# Patient Record
Sex: FEMALE | Race: WHITE | Employment: FULL TIME | URBAN - METROPOLITAN AREA
[De-identification: names, ages, dates, MRNs, and addresses within clinical notes are randomized per-mention and may not be internally consistent; named-entity substitution may affect disease eponyms.]

---

## 2018-02-22 ENCOUNTER — OFFICE VISIT (OUTPATIENT)
Dept: INTERNAL MEDICINE CLINIC | Age: 23
End: 2018-02-22

## 2018-02-22 VITALS
WEIGHT: 105 LBS | HEIGHT: 65 IN | HEART RATE: 56 BPM | OXYGEN SATURATION: 99 % | TEMPERATURE: 98 F | SYSTOLIC BLOOD PRESSURE: 129 MMHG | RESPIRATION RATE: 17 BRPM | DIASTOLIC BLOOD PRESSURE: 79 MMHG | BODY MASS INDEX: 17.49 KG/M2

## 2018-02-22 DIAGNOSIS — Z00.00 WELL ADULT ON ROUTINE HEALTH CHECK: Primary | ICD-10-CM

## 2018-02-22 DIAGNOSIS — R63.0 ANOREXIA: ICD-10-CM

## 2018-02-22 LAB
HCG URINE, QL. (POC): NEGATIVE
VALID INTERNAL CONTROL?: YES

## 2018-02-22 NOTE — PATIENT INSTRUCTIONS
Anorexia: Care Instructions  Your Care Instructions    Anorexia is a type of eating disorder. People who have anorexia don't eat enough to stay at a normal weight. Sometimes they also exercise too much. They do these things because they're so afraid of gaining weight. They believe that they're fat, even when they're thin. Often they think that losing even more weight will solve their problems and make their lives better. If you have anorexia, it can really harm your health. This is because your body doesn't get the nutrition it needs. The first step to controlling the problem is admitting that something is wrong. Then counseling can help you change how you think about food, the way you see your body, and any other emotional issues. It may take months or years, but you can recover from anorexia. Follow-up care is a key part of your treatment and safety. Be sure to make and go to all appointments, and call your doctor if you are having problems. It's also a good idea to know your test results and keep a list of the medicines you take. How can you care for yourself at home? Follow your treatment plan  · Go to your counseling sessions. Call or talk with your counselor if you can't go or if you think the sessions aren't helping. Don't just stop going. · Take your medicines exactly as prescribed. Call your doctor if you think you are having a problem with your medicine. You will get more details on the specific medicines your doctor prescribes. Trust people who are helping you  · Listen to what counselors and nutrition experts say about healthy eating. · Learn about what is included in a balanced diet. Then discuss what you learn. · Let people know how you are feeling. Listen to how they are feeling too. · Accept support and feedback from other people. Learn to be easier on yourself  · Learn to focus on your good qualities. · If your body feels weak, slow down and do less.   · Remind yourself that feeling bad about yourself is all part of your disorder. · Remember that it takes time to recover from anorexia. · Spend time with other people doing things you enjoy. · Try to focus on one goal at a time. · Don't blame yourself for your disorder. Develop healthy eating habits  · With your doctor and counselor, you will decide on a good weight for you. You will also decide how much weight you will gain each week. · Try not to argue with the people you eat with. Arguing increases stress. And stress can make make it harder for you to relax and eat. · Talk with other people during meals about things that interest you. Don't talk about food or gaining weight. Caring for a loved one with anorexia  · Remind yourself that anorexia is a long-lasting disorder. It takes time for changes to occur. · Show love and support for your loved one, especially if he or she gets discouraged. · Support your loved one as he or she goes through the steps of recovery. Focus on wellness. Don't focus on food. · Take care of yourself. Continue with your own interests, career, hobbies, and friends. · Don't blame yourself or look for the reason for the disorder. · If you are having a hard time, talk with a counselor. · Learn what to do if your loved one relapses. When should you call for help? Call 911 anytime you think you may need emergency care. For example, call if:  · A person with anorexia seems depressed and is talking about suicide. If the talk about suicide seems real, stay with the person, or ask someone you trust to stay with the person, until you get emergency help. · You have a rapid or irregular heartbeat. · You passed out (lost consciousness). Call your doctor now or seek immediate medical care if:  · You feel hopeless or have thoughts of hurting yourself. Watch closely for changes in your health, and be sure to contact your doctor if:  · You have trouble sleeping. · You feel anxious or depressed.   Where can you learn more?  Go to http://annalise-laila.info/. Enter W351 in the search box to learn more about \"Anorexia: Care Instructions. \"  Current as of: May 12, 2017  Content Version: 11.4  © 5164-2330 Healthwise, [employer]. Care instructions adapted under license by [employer] (which disclaims liability or warranty for this information). If you have questions about a medical condition or this instruction, always ask your healthcare professional. [address] any warranty or liability for your use of this information.

## 2018-02-22 NOTE — MR AVS SNAPSHOT
[address] [place] [address]. Ray Hector [address] [address] 
[phone] Patient: Seth Fatima MRN: [id number] :1995 Visit Information Date & Time Provider Department Dept. Phone Encounter #  
 2018  2:30 PM Елена Gilliam MD [facility] Internal Medicine [phone] [id number] Follow-up Instructions Return in about 1 month (around 3/22/2018). Upcoming Health Maintenance Date Due DTaP/Tdap/Td series (6 - Td) 2016 PAP AKA CERVICAL CYTOLOGY 2016 Influenza Age 5 to Adult 2017 Allergies as of 2018  Review Complete On: 2018 By: Елена Gilliam MD  
  
 Severity Noted Reaction Type Reactions Nitrous Oxide High 2013    Anaphylaxis HAD TO BE BAGGED IN DENTIST OFFICE PER MOTHER Other Medication High 2013    Anaphylaxis Nitrous Oxide Septocaine [Articaine-epinephrine] High 2013    Anaphylaxis HAD TO BE BAGGED IN DENTIST OFFICE PER MOTHER Current Immunizations  Reviewed on 2013 Name Date DTAP Vaccine 1996, 1996, 1996, 1995, 1995 HIB Vaccine 10/4/1996, 1996, 1995, 1995 HPV 2010, 2009 Hepatitis B Vaccine 3/25/1996, 1995, 1995 Human Papillomavirus 2009 IPV 2000, 1996, 1995, 1995 MMR Vaccine 2000, 10/4/1996 Meningococcal Vaccine 2008 PPD 1/10/2013  4:01 PM  
 TDAP Vaccine 2006 Varicella Virus Vaccine 1/10/2013  4:01 PM  
 Varicella Virus Vaccine Live 1996 Not reviewed this visit You Were Diagnosed With   
  
 Codes Comments Well adult on routine health check    -  Primary ICD-10-CM: Z00.00 ICD-9-CM: V70.0 Anorexia     ICD-10-CM: R63.0 ICD-9-CM: 076. 0 Vitals BP Pulse Temp Resp Height(growth percentile) Weight(growth percentile)  129/79 (BP 1 Location: Left arm, BP Patient Position: Sitting) (!) 54 08 °F (36.7 °C) (Temporal) 17 5' 4.5\" (1.638 m) 105 lb (47.6 kg) LMP SpO2 BMI OB Status Smoking Status 12/22/2017 99% 17.74 kg/m2 Unknown Never Smoker Vitals History BMI and BSA Data Body Mass Index Body Surface Area 17.74 kg/m 2 1.47 m 2 Preferred Pharmacy Pharmacy Name Phone [id number]. [phone] Your Updated Medication List  
  
   
This list is accurate as of 2/22/18  2:58 PM.  Always use your most recent med list.  
  
  
  
  
 escitalopram oxalate 10 mg tablet Commonly known as:  Zakia Shanti TAKE 1 TABLET BY MOUTH DAILY. HYDROcodone-acetaminophen 5-325 mg per tablet Commonly known as:  Flor Megan Take 1 Tab by mouth every six (6) hours as needed for Pain. ibuprofen 600 mg tablet Commonly known as:  MOTRIN Take 1 Tab by mouth every six (6) hours as needed for Pain. norethindrone-ethinyl estradiol 1-20 mg-mcg Tab Commonly known as:  JUNEL 1/20 (21) Take 1 Tab by mouth daily. * ZyrTEC 10 mg Cap Generic drug:  Cetirizine Take 1 Cap by mouth daily. * ZYRTEC PO Take  by mouth. * Notice: This list has 2 medication(s) that are the same as other medications prescribed for you. Read the directions carefully, and ask your doctor or other care provider to review them with you. We Performed the Following 10-PANEL URINE DRUG SCREEN [[id number] Custom] AMB POC EKG ROUTINE W/ 12 LEADS, INTER & REP [95342 CPT(R)] AMB POC URINE PREGNANCY TEST, VISUAL COLOR COMPARISON [98713 CPT(R)] AMYLASE [id number] CPT(R)] CBC WITH AUTOMATED DIFF [44971 CPT(R)] HEP B SURFACE AB [id number] CPT(R)] LIPASE [id number] CPT(R)] MAGNESIUM [id number] CPT(R)] METABOLIC PANEL, COMPREHENSIVE [94389 CPT(R)] PHOSPHORUS [66821 CPT(R)] QUANTIFERON TB GOLD [[id number] Custom] TSH AND FREE T4 [75415 CPT(R)] UA/M W/RFLX CULTURE, COMP [91717 CPT(R)] Follow-up Instructions Return in about 1 month (around 3/22/2018). Patient Instructions Anorexia: Care Instructions Your Care Instructions Anorexia is a type of eating disorder. People who have anorexia don't eat enough to stay at a normal weight. Sometimes they also exercise too much. They do these things because they're so afraid of gaining weight. They believe that they're fat, even when they're thin. Often they think that losing even more weight will solve their problems and make their lives better. If you have anorexia, it can really harm your health. This is because your body doesn't get the nutrition it needs. The first step to controlling the problem is admitting that something is wrong. Then counseling can help you change how you think about food, the way you see your body, and any other emotional issues. It may take months or years, but you can recover from anorexia. Follow-up care is a key part of your treatment and safety. Be sure to make and go to all appointments, and call your doctor if you are having problems. It's also a good idea to know your test results and keep a list of the medicines you take. How can you care for yourself at home? Follow your treatment plan · Go to your counseling sessions. Call or talk with your counselor if you can't go or if you think the sessions aren't helping. Don't just stop going. · Take your medicines exactly as prescribed. Call your doctor if you think you are having a problem with your medicine. You will get more details on the specific medicines your doctor prescribes. Trust people who are helping you · Listen to what counselors and nutrition experts say about healthy eating. · Learn about what is included in a balanced diet. Then discuss what you learn. · Let people know how you are feeling. Listen to how they are feeling too. · Accept support and feedback from other people. Learn to be easier on yourself · Learn to focus on your good qualities. · If your body feels weak, slow down and do less. · Remind yourself that feeling bad about yourself is all part of your disorder. · Remember that it takes time to recover from anorexia. · Spend time with other people doing things you enjoy. · Try to focus on one goal at a time. · Don't blame yourself for your disorder. Develop healthy eating habits · With your doctor and counselor, you will decide on a good weight for you. You will also decide how much weight you will gain each week. · Try not to argue with the people you eat with. Arguing increases stress. And stress can make make it harder for you to relax and eat. · Talk with other people during meals about things that interest you. Don't talk about food or gaining weight. Caring for a loved one with anorexia · Remind yourself that anorexia is a long-lasting disorder. It takes time for changes to occur. · Show love and support for your loved one, especially if he or she gets discouraged. · Support your loved one as he or she goes through the steps of recovery. Focus on wellness. Don't focus on food. · Take care of yourself. Continue with your own interests, career, hobbies, and friends. · Don't blame yourself or look for the reason for the disorder. · If you are having a hard time, talk with a counselor. · Learn what to do if your loved one relapses. When should you call for help? Call 911 anytime you think you may need emergency care. For example, call if: · A person with anorexia seems depressed and is talking about suicide. If the talk about suicide seems real, stay with the person, or ask someone you trust to stay with the person, until you get emergency help. · You have a rapid or irregular heartbeat. · You passed out (lost consciousness). Call your doctor now or seek immediate medical care if: 
· You feel hopeless or have thoughts of hurting yourself. Watch closely for changes in your health, and be sure to contact your doctor if: 
· You have trouble sleeping. · You feel anxious or depressed. Where can you learn more? Go to http://annalise-laila.info/. Enter F117 in the search box to learn more about \"Anorexia: Care Instructions. \" Current as of: May 12, 2017 Content Version: 11.4 © 5417-0529 [employer]. Care instructions adapted under license by [employer] (which disclaims liability or warranty for this information). If you have questions about a medical condition or this instruction, always ask your healthcare professional. [address] any warranty or liability for your use of this information. Introducing [facility]! Juan Payne introduces Floodlight patient portal. Now you can access parts of your medical record, email your doctor's office, and request medication refills online. 1. In your internet browser, go to [email or web address] 2. Click on the First Time User? Click Here link in the Sign In box. You will see the New Member Sign Up page. 3. Enter your Floodlight Access Code exactly as it appears below. You will not need to use this code after youve completed the sign-up process. If you do not sign up before the expiration date, you must request a new code. · Floodlight Access Code: [email or web address] Expires: 5/23/2018  2:57 PM 
 
4. Enter the last four digits of your Social Security Number (xxxx) and Date of Birth (mm/dd/yyyy) as indicated and click Submit. You will be taken to the next sign-up page. 5. Create a Floodlight ID. This will be your Floodlight login ID and cannot be changed, so think of one that is secure and easy to remember. 6. Create a Floodlight password. You can change your password at any time. 7. Enter your Password Reset Question and Answer. This can be used at a later time if you forget your password. 8. Enter your e-mail address. You will receive e-mail notification when new information is available in [address] 9. Click Sign Up. You can now view and download portions of your medical record. 10. Click the Download Summary menu link to download a portable copy of your medical information. If you have questions, please visit the Frequently Asked Questions section of the [employer] website. Remember, Scanbuy is NOT to be used for urgent needs. For medical emergencies, dial 911. Now available from your iPhone and Android! Please provide this summary of care documentation to your next provider. Your primary care clinician is listed as Jose Mcgovern. If you have any questions after today's visit, please call [phone].

## 2018-02-22 NOTE — LETTER
2/28/2018 4:33 PM 
 
Ms. [employer] [address] [facility] Dear [employer]: Please find your most recent results below. Resulted Orders CBC WITH AUTOMATED DIFF Result Value Ref Range WBC 5.4 3.4 - 10.8 x10E3/uL  
 RBC 4.34 3.77 - 5.28 x10E6/uL HGB 13.0 11.1 - 15.9 g/dL HCT 39.1 34.0 - 46.6 % MCV 90 79 - 97 fL  
 MCH 30.0 26.6 - 33.0 pg  
 MCHC 33.2 31.5 - 35.7 g/dL  
 RDW 14.0 12.3 - 15.4 % PLATELET 022 931 - 281 x10E3/uL NEUTROPHILS 63 Not Estab. % Lymphocytes 25 Not Estab. % MONOCYTES 7 Not Estab. % EOSINOPHILS 3 Not Estab. % BASOPHILS 2 Not Estab. %  
 ABS. NEUTROPHILS 3.4 1.4 - 7.0 x10E3/uL Abs Lymphocytes 1.3 0.7 - 3.1 x10E3/uL  
 ABS. MONOCYTES 0.4 0.1 - 0.9 x10E3/uL  
 ABS. EOSINOPHILS 0.2 0.0 - 0.4 x10E3/uL  
 ABS. BASOPHILS 0.1 0.0 - 0.2 x10E3/uL IMMATURE GRANULOCYTES 0 Not Estab. %  
 ABS. IMM. GRANS. 0.0 0.0 - 0.1 x10E3/uL Narrative Performed at:  [address]  [id number] : Bry Kinney MD, Phone:  [phone] METABOLIC PANEL, COMPREHENSIVE Result Value Ref Range Glucose 74 65 - 99 mg/dL BUN 9 6 - 20 mg/dL Creatinine 0.73 0.57 - 1.00 mg/dL GFR est non- >59 mL/min/1.73 GFR est  >59 mL/min/1.73  
 BUN/Creatinine ratio 12 9 - 23 Sodium 141 134 - 144 mmol/L Potassium 4.4 3.5 - 5.2 mmol/L Chloride 98 96 - 106 mmol/L  
 CO2 24 18 - 29 mmol/L Calcium 9.8 8.7 - 10.2 mg/dL Protein, total 7.6 6.0 - 8.5 g/dL Albumin 5.1 3.5 - 5.5 g/dL GLOBULIN, TOTAL 2.5 1.5 - 4.5 g/dL A-G Ratio 2.0 1.2 - 2.2 Bilirubin, total 1.0 0.0 - 1.2 mg/dL Alk. phosphatase 51 39 - 117 IU/L  
 AST (SGOT) 22 0 - 40 IU/L  
 ALT (SGPT) 10 0 - 32 IU/L Narrative Performed at:  [address]  [id number] : Bry Kinney MD, Phone:  [phone] LIPASE Result Value Ref Range Lipase 28 14 - 72 U/L Narrative Performed at:  [address]  [id number] : Emre Meneses MD, Phone:  [phone] AMYLASE Result Value Ref Range Amylase 43 31 - 124 U/L Narrative Performed at:  [address]  [id number] : Emre Meneses MD, Phone:  [phone] PHOSPHORUS Result Value Ref Range Phosphorus 3.2 2.5 - 4.5 mg/dL Narrative Performed at:  [address]  [id number] : Emre Meneses MD, Phone:  [phone] MAGNESIUM Result Value Ref Range Magnesium 2.1 1.6 - 2.3 mg/dL Narrative Performed at:  [address]  [id number] : Emre Meneses MD, Phone:  [phone] UA/M W/RFLX CULTURE, COMP Result Value Ref Range Specific Gravity 1.006 1.005 - 1.030  
 pH (UA) 6.0 5.0 - 7.5 Color Yellow Yellow Appearance Clear Clear Leukocyte Esterase Trace (A) Negative Protein Negative Negative/Trace Glucose Negative Negative Ketone Negative Negative Blood Negative Negative Bilirubin Negative Negative Urobilinogen 0.2 0.2 - 1.0 mg/dL Nitrites Negative Negative Microscopic Examination See additional order Comment:  
   Microscopic was indicated and was performed. URINALYSIS REFLEX Comment Comment: This specimen has reflexed to a Urine Culture. Narrative Performed at:  [address]  [id number] : Emre Meneses MD, Phone:  [phone] AMB POC URINE PREGNANCY TEST, VISUAL COLOR COMPARISON Result Value Ref Range VALID INTERNAL CONTROL POC Yes HCG urine, Ql. (POC) Negative Negative HEP B SURFACE AB Result Value Ref Range HEP B SURFACE AB, QUAL Non Reactive Comment: Non Reactive: Inconsistent with immunity, 
                            less than 10 mIU/mL Reactive:     Consistent with immunity, 
                            greater than 9.9 mIU/mL Narrative Performed at:  [address]  [id number] : Afia Justin MD, Phone:  [phone]  
10-PANEL URINE DRUG SCREEN Result Value Ref Range Amphetamines, urine Negative Ixkpnt=0174 ng/mL Comment: Amphetamine test includes Amphetamine and Methamphetamine. MDMA Negative Tjknxh=837 ng/mL Barbiturates Negative Dkiduf=804 ng/mL Benzodiazepines Negative Wcinpa=294 ng/mL Cannabinoids Positive (A) Cutoff=50 Comment:  
   Carboxy THC GC/MS Conf      144                ng/mL Cutoff=10        01 Cocaine Negative Quetou=856 ng/mL Methaqualone Negative Nftmml=570 ng/mL Opiates Negative Bbrfqg=458 ng/mL Comment:  
   Opiate test includes Codeine and Morphine only. Phencyclidine Negative Cutoff=25 ng/mL Methadone Screen, Urine Negative Uxgqpo=532 ng/mL PROPOXYPHENE, URINE Negative Aayzgf=789 ng/mL Narrative Performed at:  [address] [place] [address]  [id number] : Antionette Shoemaker MD, Phone:  [phone] TSH AND FREE T4 Result Value Ref Range TSH 1.610 0.450 - 4.500 uIU/mL T4, Free 1.06 0.82 - 1.77 ng/dL Narrative Performed at:  [address]  [id number] : Afia Justin MD, Phone:  [phone] MICROSCOPIC EXAMINATION Result Value Ref Range WBC 0-5 0 - 5 /hpf  
 RBC 0-2 0 - 2 /hpf Epithelial cells 0-10 0 - 10 /hpf Casts None seen None seen /lpf Bacteria Few None seen/Few Narrative Performed at:  [address]  [id number] : Afia Justin MD, Phone:  [phone] URINE CULTURE, COMPREHENSIVE   
 Result Value Ref Range Urine Culture,Comprehensive Mixed urogenital dada 50,000-100,000 colony forming units per mL Narrative Performed at:  [address]  [id number] : Rachel Fontenot MD, Phone:  [phone] QUANTIFERON IN TUBE REFL Result Value Ref Range QuantiFERON TB Gold (A) Negative Indeterminate Mitogen (positive control) gave low response. Comment: This may indicate anergy or immune suppression. Early draws and 
extended transit time may also result in low positive control and 
indeterminate results. QUANTIFERON CRITERIA Comment Comment: To be considered positive a specimen should have a TB Ag minus Nil 
value greater than or equal to 0.35 IU/mL and in addition the TB Ag 
minus Nil value must be greater than or equal to 25% of the Nil 
value. There may be insufficient information in these values to 
differentiate between some negative and some indeterminate test 
values. QuantiFERON TB Ag Value 0.01 IU/mL QuantiFERON Nil Value 0.05 IU/mL QuantiFERON Mitogen Value 0.02 IU/mL  
 QFT TB Ag minus Nil Value <0.00 IU/mL Interpretation: Comment Comment:  
   The QuantiFERON TB Gold (in Tube) assay is intended for use as an aid 
in the diagnosis of TB infection. Negative results suggest that there 
is no TB infection. In patients with high suspicion of exposure, a 
negative test should be repeated. A positive test indicates infection 
with Mycobacterium tuberculosis. Among individuals without 
tuberculosis infection, a positive test may be due to exposure to 
[place], M. szulgai or M. marinum. On the Internet, go to 
cdc.gov/tb for further details. Narrative Performed at:  [address]  [id number] : Rachel Fontenot MD, Phone:  [phone] RECOMMENDATIONS: 
 
 Please call me if you have any questions: [phone] Sincerely, 
 
 
Paradise Faith MD

## 2018-02-23 NOTE — PROGRESS NOTES
Subjective:     Chief Complaint   Patient presents with    Complete Physical     for admission to specialty hospital for eating disorders        She  is a 25y.o. year old female who presents today as a new patient to establish and have a physical for her up coming inpatient hospital admission for anorexia nervosa. She is here with her friend. She reports that she has been started having Anorexia nervosa for the past one year but recently its been worse. She states that she literally stopped eating anything. If she eat something her anxiety and stress level goes up. She was on lexapro until 2016. She was never seen a psychiatrist.   No period for the past two months. Has been using laxatives for BM. Denies any N/V. Feels achy. Does not take any vitamin supplement. Lives with boyfriend. A comprehensive review of systems was negative except for that written in the HPI. Objective:     Vitals:    02/22/18 1425   BP: 129/79   Pulse: (!) 56   Resp: 17   Temp: 98 °F (36.7 °C)   TempSrc: Temporal   SpO2: 99%   Weight: 105 lb (47.6 kg)   Height: 5' 4.5\" (1.638 m)       Physical Examination: General appearance - alert, well appearing, and in no distress, oriented to person, place, and time and melissa appearence. Mental status - alert, oriented to person, place, and time, depressed mood, behavior, speech, dress, motor activity, and thought processes.    Ears - bilateral TM's and external ear canals normal  Nose - normal and patent, no erythema, discharge or polyps  Mouth - mucous membranes moist, pharynx normal without lesions  Neck - supple, no significant adenopathy, thyroid exam: thyroid is normal in size without nodules or tenderness  Lymphatics - no palpable lymphadenopathy, no hepatosplenomegaly  Chest - clear to auscultation, no wheezes, rales or rhonchi, symmetric air entry  Heart - normal rate, regular rhythm, normal S1, S2, no murmurs, rubs, clicks or gallops  Abdomen - soft, nontender, nondistended, no masses or organomegaly  Neurological - alert, oriented, normal speech, no focal findings or movement disorder noted  Musculoskeletal - no joint tenderness, deformity or swelling  Extremities - peripheral pulses normal, no pedal edema, no clubbing or cyanosis  Skin - dry skin. Allergies   Allergen Reactions    Nitrous Oxide Anaphylaxis     HAD TO BE BAGGED IN DENTIST OFFICE PER MOTHER    Other Medication Anaphylaxis     Nitrous Oxide      Septocaine [Articaine-Epinephrine] Anaphylaxis     HAD TO BE BAGGED IN DENTIST OFFICE PER MOTHER      Social History     Social History    Marital status: SINGLE     Spouse name: N/A    Number of children: N/A    Years of education: N/A     Social History Main Topics    Smoking status: Never Smoker    Smokeless tobacco: Never Used    Alcohol use No    Drug use: Yes     Special: Marijuana    Sexual activity: Yes     Partners: Male     Other Topics Concern    None     Social History Narrative    ** Merged History Encounter **           Family History   Problem Relation Age of Onset    No Known Problems Father     Stroke Maternal Grandmother     Cancer Maternal Grandmother      Lung    Heart Disease Maternal Grandfather     Hypertension Maternal Grandfather     Cancer Maternal Grandfather      Brain    Hypertension Paternal Grandmother     Other Paternal Grandmother      ALLERGY TO NOVACAINE    Heart Disease Paternal Grandfather     Other Paternal Grandfather      DIALYSIS      Past Surgical History:   Procedure Laterality Date    HX HEENT      ORAL SURGERY-LOCAL WITH NITROUS OXIDE    HX OTHER SURGICAL      Wart removed from L arm.       Past Medical History:   Diagnosis Date    Anxiety 3/27/2014    Depression 3/27/2014    Depression, major, single episode, mild ([facility] [address] 3/27/2014    Dysmenorrhea 2/26/2016    Eczema     History of seasonal allergies     Insomnia 3/27/2014      Current Outpatient Prescriptions   Medication Sig Dispense Refill    norethindrone-ethinyl estradiol (JUNEL 1/20, 21,) 1-20 mg-mcg tab Take 1 Tab by mouth daily. 90 Tab 0    escitalopram oxalate (LEXAPRO) 10 mg tablet TAKE 1 TABLET BY MOUTH DAILY. 90 Tab 1    CETIRIZINE HCL (ZYRTEC PO) Take  by mouth.  Cetirizine (ZYRTEC) 10 mg Cap Take 1 Cap by mouth daily.  HYDROcodone-acetaminophen (NORCO) 5-325 mg per tablet Take 1 Tab by mouth every six (6) hours as needed for Pain. 15 Tab 0    ibuprofen (MOTRIN) 600 mg tablet Take 1 Tab by mouth every six (6) hours as needed for Pain. 20 Tab 0        Assessment/ Plan:   Diagnoses and all orders for this visit:    1. Well adult on routine health check  -     CBC WITH AUTOMATED DIFF  -     METABOLIC PANEL, COMPREHENSIVE  -     TSH AND FREE T4    2. Anorexia  -     CBC WITH AUTOMATED DIFF  -     METABOLIC PANEL, COMPREHENSIVE  -     LIPASE  -     AMYLASE  -     PHOSPHORUS  -     MAGNESIUM  -     UA/M W/RFLX CULTURE, COMP  -     AMB POC URINE PREGNANCY TEST, VISUAL COLOR COMPARISON  -     HEP B SURFACE AB  -     10-PANEL URINE DRUG SCREEN  -     TSH AND FREE T4  -     AMB POC EKG ROUTINE W/ 12 LEADS, INTER & REP  -     QUANTIFERON TB GOLD       Above test was performed today as a requirement for her up coming hospital admission for eating disorder. Counseling provided. Medication risks/benefits/costs/interactions/alternatives discussed with patient. Advised patient to call back or return to office if symptoms worsen/change/persist. If patient cannot reach us or should anything more severe/urgent arise he/she should proceed directly to the nearest emergency department. Discussed expected course/resolution/complications of diagnosis in detail with patient. Patient given a written after visit summary which includes her diagnoses, current medications and vitals. Patient expressed understanding with the diagnosis and plan.        Follow-up Disposition:  Return in about 1 month (around 3/22/2018), or if symptoms worsen or fail to improve.

## 2018-02-26 ENCOUNTER — TELEPHONE (OUTPATIENT)
Dept: INTERNAL MEDICINE CLINIC | Age: 23
End: 2018-02-26

## 2018-02-26 NOTE — TELEPHONE ENCOUNTER
Ramona Vincent and wanted to know if visit note, ekg results, lab results, and forms that were faxed over were completed.     FAX: [phone]

## 2018-02-27 LAB
ALBUMIN SERPL-MCNC: 5.1 G/DL (ref 3.5–5.5)
ALBUMIN/GLOB SERPL: 2 {RATIO} (ref 1.2–2.2)
ALP SERPL-CCNC: 51 IU/L (ref 39–117)
ALT SERPL-CCNC: 10 IU/L (ref 0–32)
AMPHETAMINES UR QL SCN: NEGATIVE NG/ML
AMYLASE SERPL-CCNC: 43 U/L (ref 31–124)
ANNOTATION COMMENT IMP: ABNORMAL
APPEARANCE UR: CLEAR
AST SERPL-CCNC: 22 IU/L (ref 0–40)
BACTERIA #/AREA URNS HPF: NORMAL /[HPF]
BACTERIA UR CULT: NORMAL
BARBITURATES UR QL SCN: NEGATIVE NG/ML
BASOPHILS # BLD AUTO: 0.1 X10E3/UL (ref 0–0.2)
BASOPHILS NFR BLD AUTO: 2 %
BENZODIAZ UR QL: NEGATIVE NG/ML
BILIRUB SERPL-MCNC: 1 MG/DL (ref 0–1.2)
BILIRUB UR QL STRIP: NEGATIVE
BUN SERPL-MCNC: 9 MG/DL (ref 6–20)
BUN/CREAT SERPL: 12 (ref 9–23)
BZE UR QL: NEGATIVE NG/ML
CALCIUM SERPL-MCNC: 9.8 MG/DL (ref 8.7–10.2)
CANNABINOIDS UR QL CFM: POSITIVE
CASTS URNS QL MICRO: NORMAL /LPF
CHLORIDE SERPL-SCNC: 98 MMOL/L (ref 96–106)
CO2 SERPL-SCNC: 24 MMOL/L (ref 18–29)
COLOR UR: YELLOW
CREAT SERPL-MCNC: 0.73 MG/DL (ref 0.57–1)
EOSINOPHIL # BLD AUTO: 0.2 X10E3/UL (ref 0–0.4)
EOSINOPHIL NFR BLD AUTO: 3 %
EPI CELLS #/AREA URNS HPF: NORMAL /HPF
ERYTHROCYTE [DISTWIDTH] IN BLOOD BY AUTOMATED COUNT: 14 % (ref 12.3–15.4)
GAMMA INTERFERON BACKGROUND BLD IA-ACNC: 0.05 IU/ML
GFR SERPLBLD CREATININE-BSD FMLA CKD-EPI: 117 ML/MIN/1.73
GFR SERPLBLD CREATININE-BSD FMLA CKD-EPI: 135 ML/MIN/1.73
GLOBULIN SER CALC-MCNC: 2.5 G/DL (ref 1.5–4.5)
GLUCOSE SERPL-MCNC: 74 MG/DL (ref 65–99)
GLUCOSE UR QL: NEGATIVE
HBV SURFACE AB SER QL: NON REACTIVE
HCT VFR BLD AUTO: 39.1 % (ref 34–46.6)
HGB BLD-MCNC: 13 G/DL (ref 11.1–15.9)
HGB UR QL STRIP: NEGATIVE
IMM GRANULOCYTES # BLD: 0 X10E3/UL (ref 0–0.1)
IMM GRANULOCYTES NFR BLD: 0 %
KETONES UR QL STRIP: NEGATIVE
LEUKOCYTE ESTERASE UR QL STRIP: ABNORMAL
LIPASE SERPL-CCNC: 28 U/L (ref 14–72)
LYMPHOCYTES # BLD AUTO: 1.3 X10E3/UL (ref 0.7–3.1)
LYMPHOCYTES NFR BLD AUTO: 25 %
M TB IFN-G BLD-IMP: ABNORMAL
M TB IFN-G CD4+ BCKGRND COR BLD-ACNC: <0 IU/ML
M TB IFN-G CD4+ T-CELLS BLD-ACNC: 0.01 IU/ML
MAGNESIUM SERPL-MCNC: 2.1 MG/DL (ref 1.6–2.3)
MCH RBC QN AUTO: 30 PG (ref 26.6–33)
MCHC RBC AUTO-ENTMCNC: 33.2 G/DL (ref 31.5–35.7)
MCV RBC AUTO: 90 FL (ref 79–97)
MDMA UR QL SCN: NEGATIVE NG/ML
METHADONE UR QL SCN: NEGATIVE NG/ML
METHAQUALONE UR QL: NEGATIVE NG/ML
MICRO URNS: ABNORMAL
MITOGEN IGNF BLD-ACNC: 0.02 IU/ML
MONOCYTES # BLD AUTO: 0.4 X10E3/UL (ref 0.1–0.9)
MONOCYTES NFR BLD AUTO: 7 %
NEUTROPHILS # BLD AUTO: 3.4 X10E3/UL (ref 1.4–7)
NEUTROPHILS NFR BLD AUTO: 63 %
NITRITE UR QL STRIP: NEGATIVE
OPIATES UR QL: NEGATIVE NG/ML
PCP UR QL: NEGATIVE NG/ML
PH UR STRIP: 6 [PH] (ref 5–7.5)
PHOSPHATE SERPL-MCNC: 3.2 MG/DL (ref 2.5–4.5)
PLATELET # BLD AUTO: 309 X10E3/UL (ref 150–379)
POTASSIUM SERPL-SCNC: 4.4 MMOL/L (ref 3.5–5.2)
PROPOXYPH UR QL: NEGATIVE NG/ML
PROT SERPL-MCNC: 7.6 G/DL (ref 6–8.5)
PROT UR QL STRIP: NEGATIVE
QUANTIFERON INCUBATION: NORMAL
RBC # BLD AUTO: 4.34 X10E6/UL (ref 3.77–5.28)
RBC #/AREA URNS HPF: NORMAL /HPF
SERVICE CMNT-IMP: ABNORMAL
SODIUM SERPL-SCNC: 141 MMOL/L (ref 134–144)
SP GR UR: 1.01 (ref 1–1.03)
T4 FREE SERPL-MCNC: 1.06 NG/DL (ref 0.82–1.77)
TSH SERPL DL<=0.005 MIU/L-ACNC: 1.61 UIU/ML (ref 0.45–4.5)
URINALYSIS REFLEX , 377201: ABNORMAL
UROBILINOGEN UR STRIP-MCNC: 0.2 MG/DL (ref 0.2–1)
WBC # BLD AUTO: 5.4 X10E3/UL (ref 3.4–10.8)
WBC #/AREA URNS HPF: NORMAL /HPF

## 2018-02-28 ENCOUNTER — TELEPHONE (OUTPATIENT)
Dept: INTERNAL MEDICINE CLINIC | Age: 23
End: 2018-02-28

## 2018-02-28 NOTE — PROGRESS NOTES
Please fax the result to the inpatient hospital for eating disorder. TB test in indeterminate. Need a follow up TB test in one month.

## 2018-02-28 NOTE — TELEPHONE ENCOUNTER
----- Message from Елена Gilliam MD sent at 2/28/2018  4:16 PM EST -----  Please fax the result to the inpatient hospital for eating disorder. TB test in indeterminate. Need a follow up TB test in one month.

## 2018-02-28 NOTE — TELEPHONE ENCOUNTER
Discussed results with pt's mother. She verbalized her understanding. Request that copy of results be mailed.  Results already faxed to inpatient hospital.

## 2018-03-06 ENCOUNTER — TELEPHONE (OUTPATIENT)
Dept: INTERNAL MEDICINE CLINIC | Age: 23
End: 2018-03-06

## 2018-03-06 NOTE — TELEPHONE ENCOUNTER
Pt would like to speak to Dr. Ángela Mendoza about some treatment options her specialist had decided on.

## 2018-03-08 ENCOUNTER — TELEPHONE (OUTPATIENT)
Dept: INTERNAL MEDICINE CLINIC | Age: 23
End: 2018-03-08

## 2018-03-08 NOTE — TELEPHONE ENCOUNTER
Returned call:    Spoke with Lorrie acosta: dietician    Reports that patient had decided not go inpatient service. She wanted to have outpatient Rx for her eating disorder. Lorrie Pepe wanted to let me know that patient has started seeing a psychotherapist as well. Plan is to see her next week for electrolyte check and if things does not work out then she needs to go for inpatient care. Plan is that psychotherapist will call her to make an appointment with me for next week.

## 2018-03-08 NOTE — TELEPHONE ENCOUNTER
Would like to speak to Dr. Rosa Maria Smith, stated she sees pt about eating disorder, will be treating her for increasing food intake to bring up weight but recommended that she follows up with dr. patel frequently, most likely weekly while food intake is happening to make sure she is medically stable.

## 2018-03-14 ENCOUNTER — OFFICE VISIT (OUTPATIENT)
Dept: INTERNAL MEDICINE CLINIC | Age: 23
End: 2018-03-14

## 2018-03-14 VITALS
HEART RATE: 59 BPM | DIASTOLIC BLOOD PRESSURE: 80 MMHG | HEIGHT: 65 IN | OXYGEN SATURATION: 100 % | SYSTOLIC BLOOD PRESSURE: 130 MMHG | TEMPERATURE: 97.1 F | RESPIRATION RATE: 18 BRPM | BODY MASS INDEX: 17.73 KG/M2 | WEIGHT: 106.4 LBS

## 2018-03-14 DIAGNOSIS — R63.0 ANOREXIA: Primary | ICD-10-CM

## 2018-03-14 DIAGNOSIS — G47.01 INSOMNIA DUE TO MEDICAL CONDITION: ICD-10-CM

## 2018-03-14 DIAGNOSIS — F41.9 ANXIETY: ICD-10-CM

## 2018-03-14 RX ORDER — TRAZODONE HYDROCHLORIDE 50 MG/1
50 TABLET ORAL
Qty: 30 TAB | Refills: 2 | Status: SHIPPED | OUTPATIENT
Start: 2018-03-14 | End: 2018-06-07 | Stop reason: SDUPTHER

## 2018-03-14 NOTE — MR AVS SNAPSHOT
[address] [place] [address] 
 
 
 [address] 
[phone] Patient: Latosha Contreras MRN: [id number] :1995 Visit Information Date & Time Provider Department Dept. Phone Encounter #  
 3/14/2018  3:00 PM Samy Sal MD [facility] Internal Medicine [phone] [id number] Follow-up Instructions Return in about 1 week (around 3/21/2018). Your Appointments 3/22/2018  1:45 PM  
ROUTINE CARE with Samy Sal MD  
[facility] Internal Medicine [facility] Appt Note: follow up  
 [address] [facility] [id number]  
[phone]  
  
   
 [address] [address] Upcoming Health Maintenance Date Due DTaP/Tdap/Td series (6 - Td) 2016 PAP AKA CERVICAL CYTOLOGY 2016 Influenza Age 5 to Adult 2017 Allergies as of 3/14/2018  Review Complete On: 3/14/2018 By: Samy Sal MD  
  
 Severity Noted Reaction Type Reactions Nitrous Oxide High 2013    Anaphylaxis HAD TO BE BAGGED IN DENTIST OFFICE PER MOTHER Other Medication High 2013    Anaphylaxis Nitrous Oxide Septocaine [Articaine-epinephrine] High 2013    Anaphylaxis HAD TO BE BAGGED IN DENTIST OFFICE PER MOTHER Current Immunizations  Reviewed on 2013 Name Date DTAP Vaccine 1996, 1996, 1996, 1995, 1995 HIB Vaccine 10/4/1996, 1996, 1995, 1995 HPV 2010, 2009 Hepatitis B Vaccine 3/25/1996, 1995, 1995 Human Papillomavirus 2009 IPV 2000, 1996, 1995, 1995 MMR Vaccine 2000, 10/4/1996 Meningococcal Vaccine 2008 PPD 1/10/2013  4:01 PM  
 TDAP Vaccine 2006 Varicella Virus Vaccine 1/10/2013  4:01 PM  
 Varicella Virus Vaccine Live 1996 Not reviewed this visit You Were Diagnosed With   
  
 Codes Comments Anorexia    -  Primary ICD-10-CM: R63.0 ICD-9-CM: 783.0 Anxiety     ICD-10-CM: F41.9 ICD-9-CM: 300.00 Insomnia due to medical condition     ICD-10-CM: G47.01 
ICD-9-CM: 327.01 Vitals BP Pulse Temp Resp Height(growth percentile) Weight(growth percentile) 153/89 (BP 1 Location: Left arm, BP Patient Position: Sitting) (!) 59 97.1 °F (36.2 °C) (Temporal) 18 5' 4.5\" (1.638 m) 106 lb 6.4 oz (48.3 kg) LMP SpO2 BMI OB Status Smoking Status 02/21/2018 100% 17.98 kg/m2 Unknown Never Smoker Vitals History BMI and BSA Data Body Mass Index Body Surface Area  
 17.98 kg/m 2 1.48 m 2 Preferred Pharmacy [facility] Name Phone [id number] [address] [facility]. [phone] Your Updated Medication List  
  
   
This list is accurate as of 3/14/18  3:27 PM.  Always use your most recent med list.  
  
  
  
  
 traZODone 50 mg tablet Commonly known as:  Lolis Gold Take 1 Tab by mouth nightly. Prescriptions Sent to Pharmacy Refills  
 traZODone (DESYREL) 50 mg tablet 2 Sig: Take 1 Tab by mouth nightly. Class: Normal  
 Pharmacy: [facility] [place] [address] [address] [facility].  #: [phone] Route: Oral  
  
We Performed the Following AMYLASE L657494 CPT(R)] CBC WITH AUTOMATED DIFF [84681 CPT(R)] LIPASE [id number] CPT(R)] MAGNESIUM [id number] CPT(R)] METABOLIC PANEL, COMPREHENSIVE [05568 CPT(R)] PHOSPHORUS [71440 CPT(R)] Follow-up Instructions Return in about 1 week (around 3/21/2018). Patient Instructions Anorexia: Care Instructions Your Care Instructions Anorexia is a type of eating disorder. People who have anorexia don't eat enough to stay at a normal weight. Sometimes they also exercise too much. They do these things because they're so afraid of gaining weight. They believe that they're fat, even when they're thin.  Often they think that losing even more weight will solve their problems and make their lives better. If you have anorexia, it can really harm your health. This is because your body doesn't get the nutrition it needs. The first step to controlling the problem is admitting that something is wrong. Then counseling can help you change how you think about food, the way you see your body, and any other emotional issues. It may take months or years, but you can recover from anorexia. Follow-up care is a key part of your treatment and safety. Be sure to make and go to all appointments, and call your doctor if you are having problems. It's also a good idea to know your test results and keep a list of the medicines you take. How can you care for yourself at home? Follow your treatment plan · Go to your counseling sessions. Call or talk with your counselor if you can't go or if you think the sessions aren't helping. Don't just stop going. · Take your medicines exactly as prescribed. Call your doctor if you think you are having a problem with your medicine. You will get more details on the specific medicines your doctor prescribes. Trust people who are helping you · Listen to what counselors and nutrition experts say about healthy eating. · Learn about what is included in a balanced diet. Then discuss what you learn. · Let people know how you are feeling. Listen to how they are feeling too. · Accept support and feedback from other people. Learn to be easier on yourself · Learn to focus on your good qualities. · If your body feels weak, slow down and do less. · Remind yourself that feeling bad about yourself is all part of your disorder. · Remember that it takes time to recover from anorexia. · Spend time with other people doing things you enjoy. · Try to focus on one goal at a time. · Don't blame yourself for your disorder. Develop healthy eating habits · With your doctor and counselor, you will decide on a good weight for you. You will also decide how much weight you will gain each week. · Try not to argue with the people you eat with. Arguing increases stress. And stress can make make it harder for you to relax and eat. · Talk with other people during meals about things that interest you. Don't talk about food or gaining weight. Caring for a loved one with anorexia · Remind yourself that anorexia is a long-lasting disorder. It takes time for changes to occur. · Show love and support for your loved one, especially if he or she gets discouraged. · Support your loved one as he or she goes through the steps of recovery. Focus on wellness. Don't focus on food. · Take care of yourself. Continue with your own interests, career, hobbies, and friends. · Don't blame yourself or look for the reason for the disorder. · If you are having a hard time, talk with a counselor. · Learn what to do if your loved one relapses. When should you call for help? Call 911 anytime you think you may need emergency care. For example, call if: · A person with anorexia seems depressed and is talking about suicide. If the talk about suicide seems real, stay with the person, or ask someone you trust to stay with the person, until you get emergency help. · You have a rapid or irregular heartbeat. · You passed out (lost consciousness). Call your doctor now or seek immediate medical care if: 
· You feel hopeless or have thoughts of hurting yourself. Watch closely for changes in your health, and be sure to contact your doctor if: 
· You have trouble sleeping. · You feel anxious or depressed. Where can you learn more? Go to http://annalise-laila.info/. Enter O662 in the search box to learn more about \"Anorexia: Care Instructions. \" Current as of: May 12, 2017 Content Version: 11.4 © 0810-4626 Healthwise, AddressReport.  Care instructions adapted under license by [employer] (which disclaims liability or warranty for this information). If you have questions about a medical condition or this instruction, always ask your healthcare professional. [address] any warranty or liability for your use of this information. Introducing [facility] SERVICES! [employer] introduces Z[employer]m [employer]R patient portal. Now you can access parts of your medical record, email your doctor's office, and request medication refills online. 1. In your internet browser, go to [email or web address] 2. Click on the First Time User? Click Here link in the Sign In box. You will see the New Member Sign Up page. 3. Enter your ZÃ¼m XR Access Code exactly as it appears below. You will not need to use this code after youve completed the sign-up process. If you do not sign up before the expiration date, you must request a new code. · ZÃ¼m XR Access Code: [email or web address] Expires: 5/23/2018  3:57 PM 
 
4. Enter the last four digits of your Social Security Number (xxxx) and Date of Birth (mm/dd/yyyy) as indicated and click Submit. You will be taken to the next sign-up page. 5. Create a ZÃ¼m XR ID. This will be your Z[employer]¼m XR login ID and cannot be changed, so think of one that is secure and easy to remember. 6. Create a ZÃ¼m XR password. You can change your password at any time. 7. Enter your Password Reset Question and Answer. This can be used at a later time if you forget your password. 8. Enter your e-mail address. You will receive e-mail notification when new information is available in [address] 9. Click Sign Up. You can now view and download portions of your medical record. 10. Click the Download Summary menu link to download a portable copy of your medical information. If you have questions, please visit the Frequently Asked Questions section of the ZÃ¼m XR website. Remember, Z[employer]R is NOT to be used for urgent needs. For medical emergencies, dial 911. Now available from your iPhone and Android! Please provide this summary of care documentation to your next provider. Your primary care clinician is listed as Mal Felder. If you have any questions after today's visit, please call [phone].

## 2018-03-14 NOTE — PATIENT INSTRUCTIONS
Anorexia: Care Instructions  Your Care Instructions    Anorexia is a type of eating disorder. People who have anorexia don't eat enough to stay at a normal weight. Sometimes they also exercise too much. They do these things because they're so afraid of gaining weight. They believe that they're fat, even when they're thin. Often they think that losing even more weight will solve their problems and make their lives better. If you have anorexia, it can really harm your health. This is because your body doesn't get the nutrition it needs. The first step to controlling the problem is admitting that something is wrong. Then counseling can help you change how you think about food, the way you see your body, and any other emotional issues. It may take months or years, but you can recover from anorexia. Follow-up care is a key part of your treatment and safety. Be sure to make and go to all appointments, and call your doctor if you are having problems. It's also a good idea to know your test results and keep a list of the medicines you take. How can you care for yourself at home? Follow your treatment plan  · Go to your counseling sessions. Call or talk with your counselor if you can't go or if you think the sessions aren't helping. Don't just stop going. · Take your medicines exactly as prescribed. Call your doctor if you think you are having a problem with your medicine. You will get more details on the specific medicines your doctor prescribes. Trust people who are helping you  · Listen to what counselors and nutrition experts say about healthy eating. · Learn about what is included in a balanced diet. Then discuss what you learn. · Let people know how you are feeling. Listen to how they are feeling too. · Accept support and feedback from other people. Learn to be easier on yourself  · Learn to focus on your good qualities. · If your body feels weak, slow down and do less.   · Remind yourself that feeling bad about yourself is all part of your disorder. · Remember that it takes time to recover from anorexia. · Spend time with other people doing things you enjoy. · Try to focus on one goal at a time. · Don't blame yourself for your disorder. Develop healthy eating habits  · With your doctor and counselor, you will decide on a good weight for you. You will also decide how much weight you will gain each week. · Try not to argue with the people you eat with. Arguing increases stress. And stress can make make it harder for you to relax and eat. · Talk with other people during meals about things that interest you. Don't talk about food or gaining weight. Caring for a loved one with anorexia  · Remind yourself that anorexia is a long-lasting disorder. It takes time for changes to occur. · Show love and support for your loved one, especially if he or she gets discouraged. · Support your loved one as he or she goes through the steps of recovery. Focus on wellness. Don't focus on food. · Take care of yourself. Continue with your own interests, career, hobbies, and friends. · Don't blame yourself or look for the reason for the disorder. · If you are having a hard time, talk with a counselor. · Learn what to do if your loved one relapses. When should you call for help? Call 911 anytime you think you may need emergency care. For example, call if:  · A person with anorexia seems depressed and is talking about suicide. If the talk about suicide seems real, stay with the person, or ask someone you trust to stay with the person, until you get emergency help. · You have a rapid or irregular heartbeat. · You passed out (lost consciousness). Call your doctor now or seek immediate medical care if:  · You feel hopeless or have thoughts of hurting yourself. Watch closely for changes in your health, and be sure to contact your doctor if:  · You have trouble sleeping. · You feel anxious or depressed.   Where can you learn more?  Go to http://annalise-laila.info/. Enter W005 in the search box to learn more about \"Anorexia: Care Instructions. \"  Current as of: May 12, 2017  Content Version: 11.4  © 0875-1486 Healthwise, Scope 5. Care instructions adapted under license by [employer] (which disclaims liability or warranty for this information). If you have questions about a medical condition or this instruction, always ask your healthcare professional. [address] any warranty or liability for your use of this information.

## 2018-03-15 ENCOUNTER — TELEPHONE (OUTPATIENT)
Dept: INTERNAL MEDICINE CLINIC | Age: 23
End: 2018-03-15

## 2018-03-15 LAB
ALBUMIN SERPL-MCNC: 5.1 G/DL (ref 3.5–5.5)
ALBUMIN/GLOB SERPL: 2 {RATIO} (ref 1.2–2.2)
ALP SERPL-CCNC: 54 IU/L (ref 39–117)
ALT SERPL-CCNC: 11 IU/L (ref 0–32)
AMYLASE SERPL-CCNC: 57 U/L (ref 31–124)
AST SERPL-CCNC: 17 IU/L (ref 0–40)
BASOPHILS # BLD AUTO: 0.1 X10E3/UL (ref 0–0.2)
BASOPHILS NFR BLD AUTO: 1 %
BILIRUB SERPL-MCNC: 1 MG/DL (ref 0–1.2)
BUN SERPL-MCNC: 11 MG/DL (ref 6–20)
BUN/CREAT SERPL: 15 (ref 9–23)
CALCIUM SERPL-MCNC: 10.3 MG/DL (ref 8.7–10.2)
CHLORIDE SERPL-SCNC: 95 MMOL/L (ref 96–106)
CO2 SERPL-SCNC: 28 MMOL/L (ref 18–29)
CREAT SERPL-MCNC: 0.73 MG/DL (ref 0.57–1)
EOSINOPHIL # BLD AUTO: 0.1 X10E3/UL (ref 0–0.4)
EOSINOPHIL NFR BLD AUTO: 2 %
ERYTHROCYTE [DISTWIDTH] IN BLOOD BY AUTOMATED COUNT: 13.8 % (ref 12.3–15.4)
GFR SERPLBLD CREATININE-BSD FMLA CKD-EPI: 117 ML/MIN/1.73
GFR SERPLBLD CREATININE-BSD FMLA CKD-EPI: 135 ML/MIN/1.73
GLOBULIN SER CALC-MCNC: 2.6 G/DL (ref 1.5–4.5)
GLUCOSE SERPL-MCNC: 75 MG/DL (ref 65–99)
HCT VFR BLD AUTO: 42.3 % (ref 34–46.6)
HGB BLD-MCNC: 14.3 G/DL (ref 11.1–15.9)
IMM GRANULOCYTES # BLD: 0 X10E3/UL (ref 0–0.1)
IMM GRANULOCYTES NFR BLD: 0 %
LIPASE SERPL-CCNC: 26 U/L (ref 14–72)
LYMPHOCYTES # BLD AUTO: 2 X10E3/UL (ref 0.7–3.1)
LYMPHOCYTES NFR BLD AUTO: 30 %
MAGNESIUM SERPL-MCNC: 1.9 MG/DL (ref 1.6–2.3)
MCH RBC QN AUTO: 31 PG (ref 26.6–33)
MCHC RBC AUTO-ENTMCNC: 33.8 G/DL (ref 31.5–35.7)
MCV RBC AUTO: 92 FL (ref 79–97)
MONOCYTES # BLD AUTO: 0.3 X10E3/UL (ref 0.1–0.9)
MONOCYTES NFR BLD AUTO: 5 %
NEUTROPHILS # BLD AUTO: 4.3 X10E3/UL (ref 1.4–7)
NEUTROPHILS NFR BLD AUTO: 62 %
PHOSPHATE SERPL-MCNC: 3.9 MG/DL (ref 2.5–4.5)
PLATELET # BLD AUTO: 323 X10E3/UL (ref 150–379)
POTASSIUM SERPL-SCNC: 5 MMOL/L (ref 3.5–5.2)
PROT SERPL-MCNC: 7.7 G/DL (ref 6–8.5)
RBC # BLD AUTO: 4.62 X10E6/UL (ref 3.77–5.28)
SODIUM SERPL-SCNC: 139 MMOL/L (ref 134–144)
WBC # BLD AUTO: 6.9 X10E3/UL (ref 3.4–10.8)

## 2018-03-15 NOTE — PROGRESS NOTES
Subjective:     Chief Complaint   Patient presents with    Eating Disorder     1 mo f/u        She  is a 25y.o. year old female with anorexia nervosa who presents today for follow up on her eating disorder. She was suppose to start a inpatient rehab therapy two weeks ago but due to insurance issue she was not able to so she started following up with psychotherapist as well as a nutritionist. Patient reports that she has started eating little better, has stopped taking laxtives and exercise. Gained one pound since last visit. Denies any palpitation, abdominal pain, chest pain, N/V. States that she feels more energetic. Has been trying to take 9 00-1,000 calorie diet/24 hrs. Patient does have h/o anxiety and insomnia issue. Since she started eating little bit her anxiety has been worse. No SI or homicidal ideation. Lives with boyfriend and her parents are involve with her care as well. A comprehensive review of systems was negative except for that written in the HPI. Objective:     Vitals:    03/14/18 1501 03/14/18 2014   BP: 153/89 130/80   Pulse: (!) 59    Resp: 18    Temp: 97.1 °F (36.2 °C)    TempSrc: Temporal    SpO2: 100%    Weight: 106 lb 6.4 oz (48.3 kg)    Height: 5' 4.5\" (1.638 m)        Physical Examination: General appearance - alert, well appearing, and in no distress, oriented to person, place, and time and looks lot better than lat time.    Mental status - alert, oriented to person, place, and time, normal mood, behavior, speech, dress, motor activity, and thought processes  Mouth - mucous membranes moist, pharynx normal without lesions  Neck - supple, no significant adenopathy  Chest - clear to auscultation, no wheezes, rales or rhonchi, symmetric air entry  Heart - normal rate, regular rhythm, normal S1, S2, no murmurs, rubs, clicks or gallops  Neurological - alert, oriented, normal speech, no focal findings or movement disorder noted  Skin - normal coloration and turgor, no rashes, no suspicious skin lesions noted    Allergies   Allergen Reactions    Nitrous Oxide Anaphylaxis     HAD TO BE BAGGED IN DENTIST OFFICE PER MOTHER    Other Medication Anaphylaxis     Nitrous Oxide      Septocaine [Articaine-Epinephrine] Anaphylaxis     HAD TO BE BAGGED IN DENTIST OFFICE PER MOTHER      Social History     Social History    Marital status: SINGLE     Spouse name: N/A    Number of children: N/A    Years of education: N/A     Social History Main Topics    Smoking status: Never Smoker    Smokeless tobacco: Never Used    Alcohol use No    Drug use: Yes     Special: Marijuana    Sexual activity: Yes     Partners: Male     Other Topics Concern    None     Social History Narrative    ** Merged History Encounter **           Family History   Problem Relation Age of Onset    No Known Problems Father     Stroke Maternal Grandmother     Cancer Maternal Grandmother      Lung    Heart Disease Maternal Grandfather     Hypertension Maternal Grandfather     Cancer Maternal Grandfather      Brain    Hypertension Paternal Grandmother     Other Paternal Grandmother      ALLERGY TO NOVACAINE    Heart Disease Paternal Grandfather     Other Paternal Grandfather      DIALYSIS      Past Surgical History:   Procedure Laterality Date    HX HEENT      ORAL SURGERY-LOCAL WITH NITROUS OXIDE    HX OTHER SURGICAL      Wart removed from L arm. Past Medical History:   Diagnosis Date    Anxiety 3/27/2014    Depression 3/27/2014    Depression, major, single episode, mild ([facility] [address] 3/27/2014    Dysmenorrhea 2/26/2016    Eczema     History of seasonal allergies     Insomnia 3/27/2014      Current Outpatient Prescriptions   Medication Sig Dispense Refill    traZODone (DESYREL) 50 mg tablet Take 1 Tab by mouth nightly. 30 Tab 2        Assessment/ Plan:   Diagnoses and all orders for this visit:    1. Anorexia  -     traZODone (DESYREL) 50 mg tablet; Take 1 Tab by mouth nightly.   -     CBC WITH AUTOMATED DIFF  - AMYLASE  -     LIPASE  -     METABOLIC PANEL, COMPREHENSIVE  -     MAGNESIUM  -     PHOSPHORUS    2. Anxiety  -     traZODone (DESYREL) 50 mg tablet; Take 1 Tab by mouth nightly. -     CBC WITH AUTOMATED DIFF  -     AMYLASE  -     LIPASE  -     METABOLIC PANEL, COMPREHENSIVE  -     MAGNESIUM  -     PHOSPHORUS    3. Insomnia due to medical condition  -     traZODone (DESYREL) 50 mg tablet; Take 1 Tab by mouth nightly. -     CBC WITH AUTOMATED DIFF  -     AMYLASE  -     LIPASE  -     METABOLIC PANEL, COMPREHENSIVE  -     MAGNESIUM  -     PHOSPHORUS      Recent blood work is normal.   After discussing different Rx option for an anxiety, anorexia, insomnia we have come up with a decision that Trazodone will be a better option. States that she was on trazodone in the past and the med worked well. Advised to start 25 mg and slowly increased to 50 mg at night. Continue nutrition, psycho therapy. Counseling provided. Medication risks/benefits/costs/interactions/alternatives discussed with patient. Advised patient to call back or return to office if symptoms worsen/change/persist. If patient cannot reach us or should anything more severe/urgent arise he/she should proceed directly to the nearest emergency department. Discussed expected course/resolution/complications of diagnosis in detail with patient. Patient given a written after visit summary which includes her diagnoses, current medications and vitals. Patient expressed understanding with the diagnosis and plan. Follow-up Disposition:  Return in about 1 week (around 3/21/2018).

## 2018-03-16 ENCOUNTER — TELEPHONE (OUTPATIENT)
Dept: INTERNAL MEDICINE CLINIC | Age: 23
End: 2018-03-16

## 2018-03-16 NOTE — TELEPHONE ENCOUNTER
----- Message from Samy Sal MD sent at 3/16/2018 10:22 AM EDT -----  Please inform patient that labs are normal.    I already discussed the result with Dietician.

## 2018-03-21 ENCOUNTER — OFFICE VISIT (OUTPATIENT)
Dept: INTERNAL MEDICINE CLINIC | Age: 23
End: 2018-03-21

## 2018-03-21 VITALS
SYSTOLIC BLOOD PRESSURE: 124 MMHG | DIASTOLIC BLOOD PRESSURE: 66 MMHG | RESPIRATION RATE: 17 BRPM | BODY MASS INDEX: 17.96 KG/M2 | HEART RATE: 54 BPM | OXYGEN SATURATION: 100 % | WEIGHT: 107.8 LBS | TEMPERATURE: 96.9 F | HEIGHT: 65 IN

## 2018-03-21 DIAGNOSIS — R63.0 ANOREXIA: Primary | ICD-10-CM

## 2018-03-21 DIAGNOSIS — F41.9 ANXIETY: ICD-10-CM

## 2018-03-21 RX ORDER — ESCITALOPRAM OXALATE 10 MG/1
10 TABLET ORAL DAILY
Qty: 30 TAB | Refills: 1 | Status: SHIPPED | OUTPATIENT
Start: 2018-03-21 | End: 2018-04-11 | Stop reason: SDUPTHER

## 2018-03-21 NOTE — MR AVS SNAPSHOT
[address] [place] [address] 
 
 
 Yudelka Young [address] [place] [address] 
[phone] Patient: Radha See MRN: [id number] :1995 Visit Information Date & Time Provider Department Dept. Phone Encounter #  
 3/21/2018  2:00 PM Samy Sal MD [facility] Internal Medicine [phone] [id number] Follow-up Instructions Return in about 1 week (around 3/28/2018). Your Appointments 3/21/2018  2:00 PM  
ROUTINE CARE with Samy Sal MD  
[facility] Internal Medicine [facility]) Appt Note: 1 week follow up  
 Yudelka Young [address] [place] [address]  
[phone]  
  
   
 [facility] [id number]  
  
    
 3/22/2018  1:45 PM  
ROUTINE CARE with Samy Sal MD  
[facility] Internal Medicine [facility]) Appt Note: follow up  
 Yudelka Young [address] [place] [address]  
[phone] Upcoming Health Maintenance Date Due DTaP/Tdap/Td series (6 - Td) 2016 PAP AKA CERVICAL CYTOLOGY 2016 Influenza Age 5 to Adult 2017 Allergies as of 3/21/2018  Review Complete On: 3/21/2018 By: Benjie Gann LPN Severity Noted Reaction Type Reactions Nitrous Oxide High 2013    Anaphylaxis HAD TO BE BAGGED IN DENTIST OFFICE PER MOTHER Other Medication High 2013    Anaphylaxis Nitrous Oxide Septocaine [Articaine-epinephrine] High 2013    Anaphylaxis HAD TO BE BAGGED IN DENTIST OFFICE PER MOTHER Current Immunizations  Reviewed on 2013 Name Date DTAP Vaccine 1996, 1996, 1996, 1995, 1995 HIB Vaccine 10/4/1996, 1996, 1995, 1995 HPV 2010, 2009 Hepatitis B Vaccine 3/25/1996, 1995, 1995 Human Papillomavirus 2009 IPV 2000, 1996, 1995, 1995 MMR Vaccine 2000, 10/4/1996 Meningococcal Vaccine 2008  PPD 1/10/2013  4:01 PM  
 TDAP Vaccine 6/22/2006 Varicella Virus Vaccine 1/10/2013  4:01 PM  
 Varicella Virus Vaccine Live 6/20/1996 Not reviewed this visit You Were Diagnosed With   
  
 Codes Comments Anorexia    -  Primary ICD-10-CM: R63.0 ICD-9-CM: 783.0 Anxiety     ICD-10-CM: F41.9 ICD-9-CM: 300.00 Vitals BP Pulse Temp Resp Height(growth percentile) Weight(growth percentile) 124/66 (BP 1 Location: Left arm, BP Patient Position: Sitting) (!) 54 96.9 °F (36.1 °C) (Temporal) 17 5' 4.5\" (1.638 m) 107 lb 12.8 oz (48.9 kg) LMP SpO2 BMI OB Status Smoking Status 02/21/2018 100% 18.22 kg/m2 Unknown Never Smoker BMI and BSA Data Body Mass Index Body Surface Area  
 18.22 kg/m 2 1.49 m 2 Preferred Pharmacy Pharmacy Name Phone [facility]Y #[id number] Lawson, [address] [place] [address]. [phone] Your Updated Medication List  
  
   
This list is accurate as of 3/21/18 12:05 PM.  Always use your most recent med list.  
  
  
  
  
 escitalopram oxalate 10 mg tablet Commonly known as:  Adonis Au Gres Take 1 Tab by mouth daily. traZODone 50 mg tablet Commonly known as:  Delfina Colon Take 1 Tab by mouth nightly. Prescriptions Sent to Pharmacy Refills  
 escitalopram oxalate (LEXAPRO) 10 mg tablet 1 Sig: Take 1 Tab by mouth daily. Class: Normal  
 Pharmacy: [address] [place] [address] #: [phone] Route: Oral  
  
We Performed the Following CBC WITH AUTOMATED DIFF [89115 CPT(R)] MAGNESIUM [id number] CPT(R)] METABOLIC PANEL, COMPREHENSIVE [19940 CPT(R)] PHOSPHORUS [80040 CPT(R)] Follow-up Instructions Return in about 1 week (around 3/28/2018). Introducing [facility]! [employer] introduces [employer] patient portal. Now you can access parts of your medical record, email your doctor's office, and request medication refills online.    
 
1. In your internet browser, go to [email or web address] 2. Click on the First Time User? Click Here link in the Sign In box. You will see the New Member Sign Up page. 3. Enter your LookStat Access Code exactly as it appears below. You will not need to use this code after youve completed the sign-up process. If you do not sign up before the expiration date, you must request a new code. · LookStat Access Code: [email or web address] Expires: 5/23/2018  3:57 PM 
 
4. Enter the last four digits of your Social Security Number (xxxx) and Date of Birth (mm/dd/yyyy) as indicated and click Submit. You will be taken to the next sign-up page. 5. Create a myGreekt ID. This will be your LookStat login ID and cannot be changed, so think of one that is secure and easy to remember. 6. Create a LookStat password. You can change your password at any time. 7. Enter your Password Reset Question and Answer. This can be used at a later time if you forget your password. 8. Enter your e-mail address. You will receive e-mail notification when new information is available in [address] 9. Click Sign Up. You can now view and download portions of your medical record. 10. Click the Download Summary menu link to download a portable copy of your medical information. If you have questions, please visit the Frequently Asked Questions section of the LookStat website. Remember, LookStat is NOT to be used for urgent needs. For medical emergencies, dial 911. Now available from your iPhone and Android! Please provide this summary of care documentation to your next provider. If you have any questions after today's visit, please call [phone].

## 2018-03-21 NOTE — PROGRESS NOTES
Subjective:     Chief Complaint   Patient presents with    Eating Disorder        She  is a 25y.o. year old female with anorexia nervosa who presents today for follow up on her eating disorder. She started following up with psychotherapist as well as a nutritionist. Patient reports that she has started eating little better, has stopped taking laxatives and stopped exercising. Gained two pound since last visit. Denies any palpitation, abdominal pain, chest pain, N/V. States that she feels more energetic in general but for the past two days she has been feeling down and more anxious than couple of weeks ago.      Patient does have h/o anxiety and insomnia issue. States that trazodone has been helping with her sleep. Since she started eating little bit her anxiety has been worse. No SI or homicidal ideation. Lives with boyfriend and her parents are involve with her care as well.        Pertinent items are noted in HPI. Objective:     Vitals:    03/21/18 1151   BP: 124/66   Pulse: (!) 54   Resp: 17   Temp: 96.9 °F (36.1 °C)   TempSrc: Temporal   SpO2: 100%   Weight: 107 lb 12.8 oz (48.9 kg)   Height: 5' 4.5\" (1.638 m)       Physical Examination: General appearance - alert, well appearing, and in no distress, oriented to person, place, and time and thin appearence.    Mental status - alert, oriented to person, place, and time, normal mood, behavior, speech, dress, motor activity, and thought processes  Ears - bilateral TM's and external ear canals normal  Nose - normal and patent, no erythema, discharge or polyps  Mouth - mucous membranes moist, pharynx normal without lesions  Neck - supple, no significant adenopathy  Chest - clear to auscultation, no wheezes, rales or rhonchi, symmetric air entry  Heart - normal rate, regular rhythm, normal S1, S2, no murmurs, rubs, clicks or gallops  Neurological - alert, oriented, normal speech, no focal findings or movement disorder noted  Extremities - peripheral pulses normal, no pedal edema, no clubbing or cyanosis    Allergies   Allergen Reactions    Nitrous Oxide Anaphylaxis     HAD TO BE BAGGED IN DENTIST OFFICE PER MOTHER    Other Medication Anaphylaxis     Nitrous Oxide      Septocaine [Articaine-Epinephrine] Anaphylaxis     HAD TO BE BAGGED IN DENTIST OFFICE PER MOTHER      Social History     Social History    Marital status: SINGLE     Spouse name: N/A    Number of children: N/A    Years of education: N/A     Social History Main Topics    Smoking status: Never Smoker    Smokeless tobacco: Never Used    Alcohol use No    Drug use: Yes     Special: Marijuana    Sexual activity: Yes     Partners: Male     Other Topics Concern    None     Social History Narrative    ** Merged History Encounter **           Family History   Problem Relation Age of Onset    No Known Problems Father     Stroke Maternal Grandmother     Cancer Maternal Grandmother      Lung    Heart Disease Maternal Grandfather     Hypertension Maternal Grandfather     Cancer Maternal Grandfather      Brain    Hypertension Paternal Grandmother     Other Paternal Grandmother      ALLERGY TO NOVACAINE    Heart Disease Paternal Grandfather     Other Paternal Grandfather      DIALYSIS      Past Surgical History:   Procedure Laterality Date    HX HEENT      ORAL SURGERY-LOCAL WITH NITROUS OXIDE    HX OTHER SURGICAL      Wart removed from L arm. Past Medical History:   Diagnosis Date    Anxiety 3/27/2014    Depression 3/27/2014    Depression, major, single episode, mild ([facility] [address] 3/27/2014    Dysmenorrhea 2/26/2016    Eczema     History of seasonal allergies     Insomnia 3/27/2014      Current Outpatient Prescriptions   Medication Sig Dispense Refill    escitalopram oxalate (LEXAPRO) 10 mg tablet Take 1 Tab by mouth daily. 30 Tab 1    traZODone (DESYREL) 50 mg tablet Take 1 Tab by mouth nightly. 30 Tab 2        Assessment/ Plan:   Diagnoses and all orders for this visit:    1.  Anorexia  - Eating better in small portion. Continue follow up with psychotherapist and dietician.  -      CBC WITH AUTOMATED DIFF  -     METABOLIC PANEL, COMPREHENSIVE  -     PHOSPHORUS  -     MAGNESIUM  -     escitalopram oxalate (LEXAPRO) 10 mg tablet; Take 1 Tab by mouth daily. -     counseling provided. 2. Anxiety  -     Start escitalopram oxalate (LEXAPRO) 10 mg tablet; Take 1 Tab by mouth daily. Continue trazodone for sleep. Spent 50 % time counseling. Medication risks/benefits/costs/interactions/alternatives discussed with patient. Advised patient to call back or return to office if symptoms worsen/change/persist. If patient cannot reach us or should anything more severe/urgent arise he/she should proceed directly to the nearest emergency department. Discussed expected course/resolution/complications of diagnosis in detail with patient. Patient given a written after visit summary which includes her diagnoses, current medications and vitals. Patient expressed understanding with the diagnosis and plan. Follow-up Disposition:  Return in about 1 week (around 3/28/2018).

## 2018-03-22 ENCOUNTER — TELEPHONE (OUTPATIENT)
Dept: INTERNAL MEDICINE CLINIC | Age: 23
End: 2018-03-22

## 2018-03-22 LAB
ALBUMIN SERPL-MCNC: 4.8 G/DL (ref 3.5–5.5)
ALBUMIN/GLOB SERPL: 2.4 {RATIO} (ref 1.2–2.2)
ALP SERPL-CCNC: 52 IU/L (ref 39–117)
ALT SERPL-CCNC: 11 IU/L (ref 0–32)
AST SERPL-CCNC: 20 IU/L (ref 0–40)
BASOPHILS # BLD AUTO: 0.1 X10E3/UL (ref 0–0.2)
BASOPHILS NFR BLD AUTO: 1 %
BILIRUB SERPL-MCNC: 0.6 MG/DL (ref 0–1.2)
BUN SERPL-MCNC: 12 MG/DL (ref 6–20)
BUN/CREAT SERPL: 17 (ref 9–23)
CALCIUM SERPL-MCNC: 10 MG/DL (ref 8.7–10.2)
CHLORIDE SERPL-SCNC: 101 MMOL/L (ref 96–106)
CO2 SERPL-SCNC: 26 MMOL/L (ref 18–29)
CREAT SERPL-MCNC: 0.72 MG/DL (ref 0.57–1)
EOSINOPHIL # BLD AUTO: 0.2 X10E3/UL (ref 0–0.4)
EOSINOPHIL NFR BLD AUTO: 2 %
ERYTHROCYTE [DISTWIDTH] IN BLOOD BY AUTOMATED COUNT: 13.3 % (ref 12.3–15.4)
GFR SERPLBLD CREATININE-BSD FMLA CKD-EPI: 119 ML/MIN/1.73
GFR SERPLBLD CREATININE-BSD FMLA CKD-EPI: 137 ML/MIN/1.73
GLOBULIN SER CALC-MCNC: 2 G/DL (ref 1.5–4.5)
GLUCOSE SERPL-MCNC: 82 MG/DL (ref 65–99)
HCT VFR BLD AUTO: 38.3 % (ref 34–46.6)
HGB BLD-MCNC: 12.7 G/DL (ref 11.1–15.9)
IMM GRANULOCYTES # BLD: 0 X10E3/UL (ref 0–0.1)
IMM GRANULOCYTES NFR BLD: 0 %
LYMPHOCYTES # BLD AUTO: 1.5 X10E3/UL (ref 0.7–3.1)
LYMPHOCYTES NFR BLD AUTO: 19 %
MAGNESIUM SERPL-MCNC: 2 MG/DL (ref 1.6–2.3)
MCH RBC QN AUTO: 29.9 PG (ref 26.6–33)
MCHC RBC AUTO-ENTMCNC: 33.2 G/DL (ref 31.5–35.7)
MCV RBC AUTO: 90 FL (ref 79–97)
MONOCYTES # BLD AUTO: 0.5 X10E3/UL (ref 0.1–0.9)
MONOCYTES NFR BLD AUTO: 7 %
NEUTROPHILS # BLD AUTO: 5.4 X10E3/UL (ref 1.4–7)
NEUTROPHILS NFR BLD AUTO: 71 %
PHOSPHATE SERPL-MCNC: 3.7 MG/DL (ref 2.5–4.5)
PLATELET # BLD AUTO: 286 X10E3/UL (ref 150–379)
POTASSIUM SERPL-SCNC: 4.8 MMOL/L (ref 3.5–5.2)
PROT SERPL-MCNC: 6.8 G/DL (ref 6–8.5)
RBC # BLD AUTO: 4.25 X10E6/UL (ref 3.77–5.28)
SODIUM SERPL-SCNC: 142 MMOL/L (ref 134–144)
WBC # BLD AUTO: 7.6 X10E3/UL (ref 3.4–10.8)

## 2018-03-22 NOTE — TELEPHONE ENCOUNTER
----- Message from Trinity Diaz MD sent at 3/22/2018  3:58 PM EDT -----  Please inform her that :    Electrolytes and all other blood work is normal.

## 2018-03-23 ENCOUNTER — TELEPHONE (OUTPATIENT)
Dept: INTERNAL MEDICINE CLINIC | Age: 23
End: 2018-03-23

## 2018-03-28 ENCOUNTER — OFFICE VISIT (OUTPATIENT)
Dept: INTERNAL MEDICINE CLINIC | Age: 23
End: 2018-03-28

## 2018-03-28 VITALS
RESPIRATION RATE: 16 BRPM | HEART RATE: 59 BPM | BODY MASS INDEX: 18.13 KG/M2 | DIASTOLIC BLOOD PRESSURE: 89 MMHG | HEIGHT: 65 IN | SYSTOLIC BLOOD PRESSURE: 130 MMHG | WEIGHT: 108.8 LBS | TEMPERATURE: 98.6 F | OXYGEN SATURATION: 97 %

## 2018-03-28 DIAGNOSIS — G47.01 INSOMNIA DUE TO MEDICAL CONDITION: ICD-10-CM

## 2018-03-28 DIAGNOSIS — R63.0 ANOREXIA: Primary | ICD-10-CM

## 2018-03-28 DIAGNOSIS — F41.9 ANXIETY: ICD-10-CM

## 2018-03-28 NOTE — MR AVS SNAPSHOT
[address] [place] [address]. Mickiewicza Hector 26 [facility] 13 
[phone] Patient: Astrid Callahan MRN: [id number] :1995 Visit Information Date & Time Provider Department Dept. Phone Encounter #  
 3/28/2018  3:00 PM Filiberto Jean MD [facility] Internal Medicine [phone] [id number] Follow-up Instructions Return in about 1 week (around 2018). Upcoming Health Maintenance Date Due DTaP/Tdap/Td series (6 - Td) 2016 PAP AKA CERVICAL CYTOLOGY 2016 Allergies as of 3/28/2018  Review Complete On: 3/28/2018 By: Eugenie Velazco LPN Severity Noted Reaction Type Reactions Nitrous Oxide High 2013    Anaphylaxis HAD TO BE BAGGED IN DENTIST OFFICE PER MOTHER Other Medication High 2013    Anaphylaxis Nitrous Oxide Septocaine [Articaine-epinephrine] High 2013    Anaphylaxis HAD TO BE BAGGED IN DENTIST OFFICE PER MOTHER Current Immunizations  Reviewed on 2013 Name Date DTAP Vaccine 1996, 1996, 1996, 1995, 1995 HIB Vaccine 10/4/1996, 1996, 1995, 1995 HPV 2010, 2009 Hepatitis B Vaccine 3/25/1996, 1995, 1995 Human Papillomavirus 2009 IPV 2000, 1996, 1995, 1995 MMR Vaccine 2000, 10/4/1996 Meningococcal Vaccine 2008 PPD 1/10/2013  4:01 PM  
 TDAP Vaccine 2006 Varicella Virus Vaccine 1/10/2013  4:01 PM  
 Varicella Virus Vaccine Live 1996 Not reviewed this visit You Were Diagnosed With   
  
 Codes Comments Anorexia    -  Primary ICD-10-CM: R63.0 ICD-9-CM: 783.0 Anxiety     ICD-10-CM: F41.9 ICD-9-CM: 300.00 Insomnia due to medical condition     ICD-10-CM: G47.01 
ICD-9-CM: 327.01 Vitals BP Pulse Temp Resp Height(growth percentile) Weight(growth percentile) 130/89 (BP 1 Location: Left arm, BP Patient Position: Sitting) (!) 59 98.6 °F (37 °C) (Oral) 16 5' 4.5\" (1.638 m) 108 lb 12.8 oz (49.4 kg) LMP SpO2 BMI OB Status Smoking Status 03/02/2018 97% 18.39 kg/m2 Having regular periods Never Smoker BMI and BSA Data Body Mass Index Body Surface Area  
 18.39 kg/m 2 1.5 m 2 Preferred Pharmacy Pharmacy Name Phone [facility]/PHARMACY #[id number] Kayser, [address] [phone] Your Updated Medication List  
  
   
This list is accurate as of 3/28/18  3:31 PM.  Always use your most recent med list.  
  
  
  
  
 escitalopram oxalate 10 mg tablet Commonly known as:  Suzanna Orr Take 1 Tab by mouth daily. traZODone 50 mg tablet Commonly known as:  Kely Parekh Take 1 Tab by mouth nightly. Follow-up Instructions Return in about 1 week (around 4/4/2018). Introducing [facility]! Roel Morrow introduces [employer] patient portal. Now you can access parts of your medical record, email your doctor's office, and request medication refills online. 1. In your internet browser, go to [email or web address] 2. Click on the First Time User? Click Here link in the Sign In box. You will see the New Member Sign Up page. 3. Enter your [employer] Access Code exactly as it appears below. You will not need to use this code after youve completed the sign-up process. If you do not sign up before the expiration date, you must request a new code. · [employer] Access Code: [email or web address] Expires: 5/23/2018  3:57 PM 
 
4. Enter the last four digits of your Social Security Number (xxxx) and Date of Birth (mm/dd/yyyy) as indicated and click Submit. You will be taken to the next sign-up page. 5. Create a Inkivet ID. This will be your [employer] login ID and cannot be changed, so think of one that is secure and easy to remember. 6. Create a Inkivet password. You can change your password at any time. 7. Enter your Password Reset Question and Answer. This can be used at a later time if you forget your password. 8. Enter your e-mail address. You will receive e-mail notification when new information is available in [address] 9. Click Sign Up. You can now view and download portions of your medical record. 10. Click the Download Summary menu link to download a portable copy of your medical information. If you have questions, please visit the Frequently Asked Questions section of the APX website. Remember, APX is NOT to be used for urgent needs. For medical emergencies, dial 911. Now available from your iPhone and Android! Please provide this summary of care documentation to your next provider. If you have any questions after today's visit, please call [phone].

## 2018-03-28 NOTE — PROGRESS NOTES
Subjective:     Chief Complaint   Patient presents with    Follow-up     follow up on weight. She  is a 25y.o. year old female with h/o anorexia nervosa who presents today for follow up on her eating disorder. She started following up with psychotherapist as well as a nutritionist. Patient reports that she has started eating  better, has stopped taking laxatives and stopped exercising. Gained two 1 since last visit. Denies any palpitation, abdominal pain, chest pain, N/V. States that she feels more energetic , sleeping better and anxiety level is getting better as well. She mentioned that she has been having BM every other day.       Lives with boyfriend and her parents are involve with her care as well. Pertinent items are noted in HPI. Objective:     Vitals:    03/28/18 1510   BP: 130/89   Pulse: (!) 59   Resp: 16   Temp: 98.6 °F (37 °C)   TempSrc: Oral   SpO2: 97%   Weight: 108 lb 12.8 oz (49.4 kg)   Height: 5' 4.5\" (1.638 m)       Physical Examination: General appearance - alert, well appearing, and in no distress, oriented to person, place, and time and thin apperence but looks healthy.    Mental status - alert, oriented to person, place, and time, normal mood, behavior, speech, dress, motor activity, and thought processes  Mouth - mucous membranes moist, pharynx normal without lesions  Chest - clear to auscultation, no wheezes, rales or rhonchi, symmetric air entry  Heart - normal rate, regular rhythm, normal S1, S2, no murmurs, rubs, clicks or gallops  Extremities - peripheral pulses normal, no pedal edema, no clubbing or cyanosis, no pedal edema noted    Allergies   Allergen Reactions    Nitrous Oxide Anaphylaxis     HAD TO BE BAGGED IN DENTIST OFFICE PER MOTHER    Other Medication Anaphylaxis     Nitrous Oxide      Septocaine [Articaine-Epinephrine] Anaphylaxis     HAD TO BE BAGGED IN DENTIST OFFICE PER MOTHER      Social History     Social History    Marital status: SINGLE     Spouse name: N/A    Number of children: N/A    Years of education: N/A     Social History Main Topics    Smoking status: Never Smoker    Smokeless tobacco: Never Used    Alcohol use No    Drug use: Yes     Special: Marijuana    Sexual activity: Yes     Partners: Male     Other Topics Concern    None     Social History Narrative    ** Merged History Encounter **           Family History   Problem Relation Age of Onset    No Known Problems Father     Stroke Maternal Grandmother     Cancer Maternal Grandmother      Lung    Heart Disease Maternal Grandfather     Hypertension Maternal Grandfather     Cancer Maternal Grandfather      Brain    Hypertension Paternal Grandmother     Other Paternal Grandmother      ALLERGY TO NOVACAINE    Heart Disease Paternal Grandfather     Other Paternal Grandfather      DIALYSIS      Past Surgical History:   Procedure Laterality Date    HX HEENT      ORAL SURGERY-LOCAL WITH NITROUS OXIDE    HX OTHER SURGICAL      Wart removed from L arm. Past Medical History:   Diagnosis Date    Anxiety 3/27/2014    Depression 3/27/2014    Depression, major, single episode, mild ([facility] [address] 3/27/2014    Dysmenorrhea 2/26/2016    Eczema     History of seasonal allergies     Insomnia 3/27/2014      Current Outpatient Prescriptions   Medication Sig Dispense Refill    escitalopram oxalate (LEXAPRO) 10 mg tablet Take 1 Tab by mouth daily. 30 Tab 1    traZODone (DESYREL) 50 mg tablet Take 1 Tab by mouth nightly. 30 Tab 2        Assessment/ Plan:   Diagnoses and all orders for this visit:    1. Anorexia  -     CBC WITH AUTOMATED DIFF  -     METABOLIC PANEL, COMPREHENSIVE  -     PHOSPHORUS  -     MAGNESIUM        -     Patient continue to do better. Eating better in small portion. Continue follow up with psychotherapist and dietician. 2. Anxiety        - stable and better with lexapro. 3. Insomnia due to medical condition       - sleeping normally with trazodone.       Spent 50 % time counseling. Medication risks/benefits/costs/interactions/alternatives discussed with patient. Advised patient to call back or return to office if symptoms worsen/change/persist. If patient cannot reach us or should anything more severe/urgent arise he/she should proceed directly to the nearest emergency department. Discussed expected course/resolution/complications of diagnosis in detail with patient. Patient given a written after visit summary which includes her diagnoses, current medications and vitals. Patient expressed understanding with the diagnosis and plan. Follow-up Disposition:  Return in about 1 week (around 4/4/2018).

## 2018-03-29 ENCOUNTER — LAB ONLY (OUTPATIENT)
Dept: INTERNAL MEDICINE CLINIC | Age: 23
End: 2018-03-29

## 2018-03-30 ENCOUNTER — TELEPHONE (OUTPATIENT)
Dept: INTERNAL MEDICINE CLINIC | Age: 23
End: 2018-03-30

## 2018-03-30 LAB
ALBUMIN SERPL-MCNC: 4.4 G/DL (ref 3.5–5.5)
ALBUMIN/GLOB SERPL: 2.3 {RATIO} (ref 1.2–2.2)
ALP SERPL-CCNC: 49 IU/L (ref 39–117)
ALT SERPL-CCNC: 10 IU/L (ref 0–32)
AST SERPL-CCNC: 15 IU/L (ref 0–40)
BASOPHILS # BLD AUTO: 0.1 X10E3/UL (ref 0–0.2)
BASOPHILS NFR BLD AUTO: 1 %
BILIRUB SERPL-MCNC: 0.6 MG/DL (ref 0–1.2)
BUN SERPL-MCNC: 12 MG/DL (ref 6–20)
BUN/CREAT SERPL: 16 (ref 9–23)
CALCIUM SERPL-MCNC: 9.5 MG/DL (ref 8.7–10.2)
CHLORIDE SERPL-SCNC: 100 MMOL/L (ref 96–106)
CO2 SERPL-SCNC: 28 MMOL/L (ref 18–29)
CREAT SERPL-MCNC: 0.74 MG/DL (ref 0.57–1)
EOSINOPHIL # BLD AUTO: 0.2 X10E3/UL (ref 0–0.4)
EOSINOPHIL NFR BLD AUTO: 2 %
ERYTHROCYTE [DISTWIDTH] IN BLOOD BY AUTOMATED COUNT: 14 % (ref 12.3–15.4)
GFR SERPLBLD CREATININE-BSD FMLA CKD-EPI: 115 ML/MIN/1.73
GFR SERPLBLD CREATININE-BSD FMLA CKD-EPI: 133 ML/MIN/1.73
GLOBULIN SER CALC-MCNC: 1.9 G/DL (ref 1.5–4.5)
GLUCOSE SERPL-MCNC: 81 MG/DL (ref 65–99)
HCT VFR BLD AUTO: 36.6 % (ref 34–46.6)
HGB BLD-MCNC: 12 G/DL (ref 11.1–15.9)
IMM GRANULOCYTES # BLD: 0 X10E3/UL (ref 0–0.1)
IMM GRANULOCYTES NFR BLD: 0 %
LYMPHOCYTES # BLD AUTO: 1.7 X10E3/UL (ref 0.7–3.1)
LYMPHOCYTES NFR BLD AUTO: 20 %
MAGNESIUM SERPL-MCNC: 1.9 MG/DL (ref 1.6–2.3)
MCH RBC QN AUTO: 29.9 PG (ref 26.6–33)
MCHC RBC AUTO-ENTMCNC: 32.8 G/DL (ref 31.5–35.7)
MCV RBC AUTO: 91 FL (ref 79–97)
MONOCYTES # BLD AUTO: 0.5 X10E3/UL (ref 0.1–0.9)
MONOCYTES NFR BLD AUTO: 6 %
NEUTROPHILS # BLD AUTO: 5.8 X10E3/UL (ref 1.4–7)
NEUTROPHILS NFR BLD AUTO: 71 %
PHOSPHATE SERPL-MCNC: 3.2 MG/DL (ref 2.5–4.5)
PLATELET # BLD AUTO: 313 X10E3/UL (ref 150–379)
POTASSIUM SERPL-SCNC: 4.3 MMOL/L (ref 3.5–5.2)
PROT SERPL-MCNC: 6.3 G/DL (ref 6–8.5)
RBC # BLD AUTO: 4.01 X10E6/UL (ref 3.77–5.28)
SODIUM SERPL-SCNC: 141 MMOL/L (ref 134–144)
WBC # BLD AUTO: 8.2 X10E3/UL (ref 3.4–10.8)

## 2018-03-30 NOTE — TELEPHONE ENCOUNTER
----- Message from Samy Sal MD sent at 3/30/2018 11:35 AM EDT -----  Please call to inform her that labs are normal.

## 2018-04-04 ENCOUNTER — OFFICE VISIT (OUTPATIENT)
Dept: INTERNAL MEDICINE CLINIC | Age: 23
End: 2018-04-04

## 2018-04-04 VITALS
RESPIRATION RATE: 17 BRPM | BODY MASS INDEX: 17.76 KG/M2 | DIASTOLIC BLOOD PRESSURE: 79 MMHG | SYSTOLIC BLOOD PRESSURE: 118 MMHG | HEART RATE: 67 BPM | HEIGHT: 65 IN | WEIGHT: 106.6 LBS | TEMPERATURE: 97.4 F | OXYGEN SATURATION: 100 %

## 2018-04-04 DIAGNOSIS — R63.0 ANOREXIA: Primary | ICD-10-CM

## 2018-04-04 DIAGNOSIS — G47.01 INSOMNIA DUE TO MEDICAL CONDITION: ICD-10-CM

## 2018-04-04 DIAGNOSIS — F41.9 ANXIETY: ICD-10-CM

## 2018-04-04 NOTE — PROGRESS NOTES
Subjective:     Chief Complaint   Patient presents with    Weight Management        She  is a 25y.o. year old female with anorexia disorder who presents with her friend for one week follow up. Patient states that something happened last week which triggered her anxiety which affected her eating. She did not want to talk about the incident she states. States that she has been sleeping okay. Has been having BM every other day, not using any stool softener. She has appointment with nutritionist and counselor tomorrow. Denies any chest pain, soa, abdominal pain. Pertinent items are noted in HPI.   Objective:     Vitals:    04/04/18 1541   BP: 118/79   Pulse: 67   Resp: 17   Temp: 97.4 °F (36.3 °C)   TempSrc: Temporal   SpO2: 100%   Weight: 106 lb 9.6 oz (48.4 kg)   Height: 5' 4.5\" (1.638 m)       Physical Examination: General appearance - alert, well appearing, and in no distress and oriented to person, place, and time  Mental status - alert, oriented to person, place, and time, normal mood, behavior, speech, dress, motor activity, and thought processes  Chest - clear to auscultation, no wheezes, rales or rhonchi, symmetric air entry  Heart - normal rate, regular rhythm, normal S1, S2, no murmurs, rubs, clicks or gallops    Allergies   Allergen Reactions    Nitrous Oxide Anaphylaxis     HAD TO BE BAGGED IN DENTIST OFFICE PER MOTHER    Other Medication Anaphylaxis     Nitrous Oxide      Septocaine [Articaine-Epinephrine] Anaphylaxis     HAD TO BE BAGGED IN DENTIST OFFICE PER MOTHER      Social History     Social History    Marital status: SINGLE     Spouse name: N/A    Number of children: N/A    Years of education: N/A     Social History Main Topics    Smoking status: Never Smoker    Smokeless tobacco: Never Used    Alcohol use No    Drug use: Yes     Special: Marijuana    Sexual activity: Yes     Partners: Male     Other Topics Concern    None     Social History Narrative    ** Merged History Encounter **           Family History   Problem Relation Age of Onset    No Known Problems Father     Stroke Maternal Grandmother     Cancer Maternal Grandmother      Lung    Heart Disease Maternal Grandfather     Hypertension Maternal Grandfather     Cancer Maternal Grandfather      Brain    Hypertension Paternal Grandmother     Other Paternal Grandmother      ALLERGY TO NOVACAINE    Heart Disease Paternal Grandfather     Other Paternal Grandfather      DIALYSIS      Past Surgical History:   Procedure Laterality Date    HX HEENT      ORAL SURGERY-LOCAL WITH NITROUS OXIDE    HX OTHER SURGICAL      Wart removed from L arm. Past Medical History:   Diagnosis Date    Anxiety 3/27/2014    Depression 3/27/2014    Depression, major, single episode, mild ([facility] [address] 3/27/2014    Dysmenorrhea 2/26/2016    Eczema     History of seasonal allergies     Insomnia 3/27/2014      Current Outpatient Prescriptions   Medication Sig Dispense Refill    escitalopram oxalate (LEXAPRO) 10 mg tablet Take 1 Tab by mouth daily. 30 Tab 1    traZODone (DESYREL) 50 mg tablet Take 1 Tab by mouth nightly. 30 Tab 2        Assessment/ Plan:   Diagnoses and all orders for this visit:    1. Anorexia  -     CBC WITH AUTOMATED DIFF  -     METABOLIC PANEL, COMPREHENSIVE  -     MAGNESIUM  -     PHOSPHORUS        -   She had lost 2 pounds since last visit . She states that one incident triggered her anxiety which has affected her eating. She has an appointment with counselor and nutritionist tomorrow. 2. Anxiety        - triggered recently but able to manage her symptoms. Continue Lexapro. 3. Insomnia due to medical condition       - stable with Trazodone. Medication risks/benefits/costs/interactions/alternatives discussed with patient.   Advised patient to call back or return to office if symptoms worsen/change/persist. If patient cannot reach us or should anything more severe/urgent arise he/she should proceed directly to the nearest emergency department. Discussed expected course/resolution/complications of diagnosis in detail with patient. Patient given a written after visit summary which includes her diagnoses, current medications and vitals. Patient expressed understanding with the diagnosis and plan. Follow-up Disposition:  Return in about 1 week (around 4/11/2018).

## 2018-04-04 NOTE — MR AVS SNAPSHOT
[address] [place], [place] [address]Nessa Young 26 [address] [place] [address] 
[phone] Patient: Victoria Richardson MRN: [id number] :1995 Visit Information Date & Time Provider Department Dept. Phone Encounter #  
 2018  3:45 PM Lu Blair MD [facility] [phone] [id number] Follow-up Instructions Return in about 1 week (around 2018). Upcoming Health Maintenance Date Due DTaP/Tdap/Td series (6 - Td) 2016 PAP AKA CERVICAL CYTOLOGY 2016 Allergies as of 2018  Review Complete On: 2018 By: Diego Bradley LPN Severity Noted Reaction Type Reactions Nitrous Oxide High 2013    Anaphylaxis HAD TO BE BAGGED IN DENTIST OFFICE PER MOTHER Other Medication High 2013    Anaphylaxis Nitrous Oxide Septocaine [Articaine-epinephrine] High 2013    Anaphylaxis HAD TO BE BAGGED IN DENTIST OFFICE PER MOTHER Current Immunizations  Reviewed on 2013 Name Date DTAP Vaccine 1996, 1996, 1996, 1995, 1995 HIB Vaccine 10/4/1996, 1996, 1995, 1995 HPV 2010, 2009 Hepatitis B Vaccine 3/25/1996, 1995, 1995 Human Papillomavirus 2009 IPV 2000, 1996, 1995, 1995 MMR Vaccine 2000, 10/4/1996 Meningococcal Vaccine 2008 PPD 1/10/2013  4:01 PM  
 TDAP Vaccine 2006 Varicella Virus Vaccine 1/10/2013  4:01 PM  
 Varicella Virus Vaccine Live 1996 Not reviewed this visit You Were Diagnosed With   
  
 Codes Comments Anorexia    -  Primary ICD-10-CM: R63.0 ICD-9-CM: 783.0 Anxiety     ICD-10-CM: F41.9 ICD-9-CM: 300.00 Insomnia due to medical condition     ICD-10-CM: G47.01 
ICD-9-CM: 327.01 Vitals  BP Pulse Temp Resp Height(growth percentile) LMP  
 118/79 (BP 1 Location: Left arm, BP Patient Position: Sitting) 67 97.4 °F (36.3 °C) (Temporal) 17 5' 4.5\" (1.638 m) 04/01/2018 SpO2 OB Status Smoking Status 100% Having regular periods Never Smoker Vitals History Preferred Pharmacy Pharmacy Name Phone [id number], [address] [phone] Your Updated Medication List  
  
   
This list is accurate as of 4/4/18  3:57 PM.  Always use your most recent med list.  
  
  
  
  
 escitalopram oxalate 10 mg tablet Commonly known as:  Hi Rye Take 1 Tab by mouth daily. traZODone 50 mg tablet Commonly known as:  Donneta Kyle Take 1 Tab by mouth nightly. We Performed the Following CBC WITH AUTOMATED DIFF [17513 CPT(R)] MAGNESIUM [id number] CPT(R)] METABOLIC PANEL, COMPREHENSIVE [16962 CPT(R)] PHOSPHORUS [86863 CPT(R)] Follow-up Instructions Return in about 1 week (around 4/11/2018). Introducing [facility] & HEALTH SERVICES! [employer] introduces RewardMe patient portal. Now you can access parts of your medical record, email your doctor's office, and request medication refills online. 1. In your internet browser, go to [email or web address] 2. Click on the First Time User? Click Here link in the Sign In box. You will see the New Member Sign Up page. 3. Enter your [employer] Access Code exactly as it appears below. You will not need to use this code after youve completed the sign-up process. If you do not sign up before the expiration date, you must request a new code. · Rew[employer]Me Access Code: [email or web address] Expires: 5/23/2018  3:57 PM 
 
4. Enter the last four digits of your Social Security Number (xxxx) and Date of Birth (mm/dd/yyyy) as indicated and click Submit. You will be taken to the next sign-up page. 5. Create a RewardMe ID. This will be your RewardMe login ID and cannot be changed, so think of one that is secure and easy to remember. 6. Create a [employer] password. You can change your password at any time. 7. Enter your Password Reset Question and Answer. This can be used at a later time if you forget your password. 8. Enter your e-mail address. You will receive e-mail notification when new information is available in [address] 9. Click Sign Up. You can now view and download portions of your medical record. 10. Click the Download Summary menu link to download a portable copy of your medical information. If you have questions, please visit the Frequently Asked Questions section of the [employer] website. Remember, [employer] is NOT to be used for urgent needs. For medical emergencies, dial 911. Now available from your iPhone and Android! Please provide this summary of care documentation to your next provider. If you have any questions after today's visit, please call [phone].

## 2018-04-05 LAB
ALBUMIN SERPL-MCNC: 4.7 G/DL (ref 3.5–5.5)
ALBUMIN/GLOB SERPL: 2.2 {RATIO} (ref 1.2–2.2)
ALP SERPL-CCNC: 47 IU/L (ref 39–117)
ALT SERPL-CCNC: 12 IU/L (ref 0–32)
AST SERPL-CCNC: 18 IU/L (ref 0–40)
BASOPHILS # BLD AUTO: 0.1 X10E3/UL (ref 0–0.2)
BASOPHILS NFR BLD AUTO: 1 %
BILIRUB SERPL-MCNC: 0.7 MG/DL (ref 0–1.2)
BUN SERPL-MCNC: 11 MG/DL (ref 6–20)
BUN/CREAT SERPL: 14 (ref 9–23)
CALCIUM SERPL-MCNC: 9.7 MG/DL (ref 8.7–10.2)
CHLORIDE SERPL-SCNC: 96 MMOL/L (ref 96–106)
CO2 SERPL-SCNC: 24 MMOL/L (ref 18–29)
CREAT SERPL-MCNC: 0.76 MG/DL (ref 0.57–1)
EOSINOPHIL # BLD AUTO: 0.1 X10E3/UL (ref 0–0.4)
EOSINOPHIL NFR BLD AUTO: 2 %
ERYTHROCYTE [DISTWIDTH] IN BLOOD BY AUTOMATED COUNT: 13.8 % (ref 12.3–15.4)
GFR SERPLBLD CREATININE-BSD FMLA CKD-EPI: 112 ML/MIN/1.73
GFR SERPLBLD CREATININE-BSD FMLA CKD-EPI: 129 ML/MIN/1.73
GLOBULIN SER CALC-MCNC: 2.1 G/DL (ref 1.5–4.5)
GLUCOSE SERPL-MCNC: 79 MG/DL (ref 65–99)
HCT VFR BLD AUTO: 38.3 % (ref 34–46.6)
HGB BLD-MCNC: 12.8 G/DL (ref 11.1–15.9)
IMM GRANULOCYTES # BLD: 0 X10E3/UL (ref 0–0.1)
IMM GRANULOCYTES NFR BLD: 0 %
LYMPHOCYTES # BLD AUTO: 1.5 X10E3/UL (ref 0.7–3.1)
LYMPHOCYTES NFR BLD AUTO: 22 %
MAGNESIUM SERPL-MCNC: 2.1 MG/DL (ref 1.6–2.3)
MCH RBC QN AUTO: 30.3 PG (ref 26.6–33)
MCHC RBC AUTO-ENTMCNC: 33.4 G/DL (ref 31.5–35.7)
MCV RBC AUTO: 91 FL (ref 79–97)
MONOCYTES # BLD AUTO: 0.4 X10E3/UL (ref 0.1–0.9)
MONOCYTES NFR BLD AUTO: 6 %
NEUTROPHILS # BLD AUTO: 4.9 X10E3/UL (ref 1.4–7)
NEUTROPHILS NFR BLD AUTO: 69 %
PHOSPHATE SERPL-MCNC: 3.3 MG/DL (ref 2.5–4.5)
PLATELET # BLD AUTO: 362 X10E3/UL (ref 150–379)
POTASSIUM SERPL-SCNC: 4.5 MMOL/L (ref 3.5–5.2)
PROT SERPL-MCNC: 6.8 G/DL (ref 6–8.5)
RBC # BLD AUTO: 4.23 X10E6/UL (ref 3.77–5.28)
SODIUM SERPL-SCNC: 140 MMOL/L (ref 134–144)
WBC # BLD AUTO: 7 X10E3/UL (ref 3.4–10.8)

## 2018-04-11 ENCOUNTER — OFFICE VISIT (OUTPATIENT)
Dept: INTERNAL MEDICINE CLINIC | Age: 23
End: 2018-04-11

## 2018-04-11 VITALS
OXYGEN SATURATION: 98 % | HEIGHT: 65 IN | RESPIRATION RATE: 18 BRPM | SYSTOLIC BLOOD PRESSURE: 107 MMHG | HEART RATE: 60 BPM | TEMPERATURE: 98.1 F | BODY MASS INDEX: 17.63 KG/M2 | WEIGHT: 105.8 LBS | DIASTOLIC BLOOD PRESSURE: 65 MMHG

## 2018-04-11 DIAGNOSIS — R01.1 HEART MURMUR: ICD-10-CM

## 2018-04-11 DIAGNOSIS — R63.0 ANOREXIA: Primary | ICD-10-CM

## 2018-04-11 DIAGNOSIS — F41.9 ANXIETY: ICD-10-CM

## 2018-04-11 RX ORDER — ESCITALOPRAM OXALATE 10 MG/1
10 TABLET ORAL DAILY
Qty: 30 TAB | Refills: 2 | Status: SHIPPED | OUTPATIENT
Start: 2018-04-11 | End: 2018-05-16 | Stop reason: SDUPTHER

## 2018-04-11 NOTE — MR AVS SNAPSHOT
[place] Curry. [address]ie Young [address] [place] [address] 
[phone] Patient: Krysta Payne MRN: [id number] :1995 Visit Information Date & Time Provider Department Dept. Phone Encounter #  
 2018  2:45 PM Carlos Burrell MD [facility] Internal Medicine [phone] [id number] Follow-up Instructions Return in about 1 week (around 2018). Upcoming Health Maintenance Date Due DTaP/Tdap/Td series (6 - Td) 2016 PAP AKA CERVICAL CYTOLOGY 2016 Allergies as of 2018  Review Complete On: 2018 By: Samy Sal MD  
  
 Severity Noted Reaction Type Reactions Nitrous Oxide High 2013    Anaphylaxis HAD TO BE BAGGED IN DENTIST OFFICE PER MOTHER Other Medication High 2013    Anaphylaxis Nitrous Oxide Septocaine [Articaine-epinephrine] High 2013    Anaphylaxis HAD TO BE BAGGED IN DENTIST OFFICE PER MOTHER Current Immunizations  Reviewed on 2013 Name Date DTAP Vaccine 1996, 1996, 1996, 1995, 1995 HIB Vaccine 10/4/1996, 1996, 1995, 1995 HPV 2010, 2009 Hepatitis B Vaccine 3/25/1996, 1995, 1995 Human Papillomavirus 2009 IPV 2000, 1996, 1995, 1995 MMR Vaccine 2000, 10/4/1996 Meningococcal Vaccine 2008 PPD 1/10/2013  4:01 PM  
 TDAP Vaccine 2006 Varicella Virus Vaccine 1/10/2013  4:01 PM  
 Varicella Virus Vaccine Live 1996 Not reviewed this visit You Were Diagnosed With   
  
 Codes Comments Anorexia    -  Primary ICD-10-CM: R63.0 ICD-9-CM: 783.0 Anxiety     ICD-10-CM: F41.9 ICD-9-CM: 300.00 Heart murmur     ICD-10-CM: R01.1 ICD-9-CM: 311. 2 Vitals BP Pulse Temp Resp Height(growth percentile) Weight(growth percentile)  107/65 (BP 1 Location: Left arm, BP Patient Position: Sitting) 60 98.1 °F (36.7 °C) (Temporal) 18 5' 4.5\" (1.638 m) 105 lb 12.8 oz (48 kg) LMP SpO2 BMI OB Status Smoking Status 04/01/2018 98% 17.88 kg/m2 Having regular periods Never Smoker Vitals History BMI and BSA Data Body Mass Index Body Surface Area  
 17.88 kg/m 2 1.48 m 2 Preferred Pharmacy Pharmacy Name Phone [id number], [address] [facility]. [phone] Your Updated Medication List  
  
   
This list is accurate as of 4/11/18  3:33 PM.  Always use your most recent med list.  
  
  
  
  
 escitalopram oxalate 10 mg tablet Commonly known as:  Taylor Li Take 1 Tab by mouth daily. traZODone 50 mg tablet Commonly known as:  Darreld Fulford Take 1 Tab by mouth nightly. Prescriptions Sent to Pharmacy Refills  
 escitalopram oxalate (LEXAPRO) 10 mg tablet 2 Sig: Take 1 Tab by mouth daily. Class: Normal  
 Pharmacy: [address] [place] [address], [address] [facility].  #: [phone] Route: Oral  
  
We Performed the Following CBC WITH AUTOMATED DIFF [62150 CPT(R)] MAGNESIUM [id number] CPT(R)] METABOLIC PANEL, COMPREHENSIVE [87348 CPT(R)] PHOSPHORUS [02786 CPT(R)] Follow-up Instructions Return in about 1 week (around 4/18/2018). To-Do List   
 04/11/2018 ECHO:  2D ECHO COMPLETE ADULT (TTE) W OR WO CONTR Introducing [facility] & HEALTH SERVICES! [employer] introduces Relayr patient portal. Now you can access parts of your medical record, email your doctor's office, and request medication refills online. 1. In your internet browser, go to [email or web address]. 2. Click on the First Time User? Click Here link in the Sign In box. You will see the New Member Sign Up page. 3. Enter your Rel[employer]r Access Code exactly as it appears below. You will not need to use this code after youve completed the sign-up process.  If you do not sign up before the expiration date, you must request a new code. · [employer] Access Code: [email or web address] Expires: 5/23/2018  3:57 PM 
 
4. Enter the last four digits of your Social Security Number (xxxx) and Date of Birth (mm/dd/yyyy) as indicated and click Submit. You will be taken to the next sign-up page. 5. Create a [employer] ID. This will be your [employer] login ID and cannot be changed, so think of one that is secure and easy to remember. 6. Create a [employer] password. You can change your password at any time. 7. Enter your Password Reset Question and Answer. This can be used at a later time if you forget your password. 8. Enter your e-mail address. You will receive e-mail notification when new information is available in [address] 9. Click Sign Up. You can now view and download portions of your medical record. 10. Click the Download Summary menu link to download a portable copy of your medical information. If you have questions, please visit the Frequently Asked Questions section of the W[employer]ply website. Remember, [employer]ply is NOT to be used for urgent needs. For medical emergencies, dial 911. Now available from your iPhone and Android! Please provide this summary of care documentation to your next provider. If you have any questions after today's visit, please call [phone].

## 2018-04-12 LAB
ALBUMIN SERPL-MCNC: 4.8 G/DL (ref 3.5–5.5)
ALBUMIN/GLOB SERPL: 2 {RATIO} (ref 1.2–2.2)
ALP SERPL-CCNC: 47 IU/L (ref 39–117)
ALT SERPL-CCNC: 11 IU/L (ref 0–32)
AST SERPL-CCNC: 23 IU/L (ref 0–40)
BASOPHILS # BLD AUTO: 0.1 X10E3/UL (ref 0–0.2)
BASOPHILS NFR BLD AUTO: 1 %
BILIRUB SERPL-MCNC: 0.6 MG/DL (ref 0–1.2)
BUN SERPL-MCNC: 15 MG/DL (ref 6–20)
BUN/CREAT SERPL: 21 (ref 9–23)
CALCIUM SERPL-MCNC: 10.1 MG/DL (ref 8.7–10.2)
CHLORIDE SERPL-SCNC: 98 MMOL/L (ref 96–106)
CO2 SERPL-SCNC: 27 MMOL/L (ref 18–29)
CREAT SERPL-MCNC: 0.7 MG/DL (ref 0.57–1)
EOSINOPHIL # BLD AUTO: 0.1 X10E3/UL (ref 0–0.4)
EOSINOPHIL NFR BLD AUTO: 2 %
ERYTHROCYTE [DISTWIDTH] IN BLOOD BY AUTOMATED COUNT: 13.7 % (ref 12.3–15.4)
GFR SERPLBLD CREATININE-BSD FMLA CKD-EPI: 123 ML/MIN/1.73
GFR SERPLBLD CREATININE-BSD FMLA CKD-EPI: 142 ML/MIN/1.73
GLOBULIN SER CALC-MCNC: 2.4 G/DL (ref 1.5–4.5)
GLUCOSE SERPL-MCNC: 80 MG/DL (ref 65–99)
HCT VFR BLD AUTO: 37.6 % (ref 34–46.6)
HGB BLD-MCNC: 12.7 G/DL (ref 11.1–15.9)
IMM GRANULOCYTES # BLD: 0 X10E3/UL (ref 0–0.1)
IMM GRANULOCYTES NFR BLD: 0 %
LYMPHOCYTES # BLD AUTO: 1.7 X10E3/UL (ref 0.7–3.1)
LYMPHOCYTES NFR BLD AUTO: 19 %
MAGNESIUM SERPL-MCNC: 1.9 MG/DL (ref 1.6–2.3)
MCH RBC QN AUTO: 30.8 PG (ref 26.6–33)
MCHC RBC AUTO-ENTMCNC: 33.8 G/DL (ref 31.5–35.7)
MCV RBC AUTO: 91 FL (ref 79–97)
MONOCYTES # BLD AUTO: 0.5 X10E3/UL (ref 0.1–0.9)
MONOCYTES NFR BLD AUTO: 6 %
NEUTROPHILS # BLD AUTO: 6.2 X10E3/UL (ref 1.4–7)
NEUTROPHILS NFR BLD AUTO: 72 %
PHOSPHATE SERPL-MCNC: 3.4 MG/DL (ref 2.5–4.5)
PLATELET # BLD AUTO: 283 X10E3/UL (ref 150–379)
POTASSIUM SERPL-SCNC: 4.7 MMOL/L (ref 3.5–5.2)
PROT SERPL-MCNC: 7.2 G/DL (ref 6–8.5)
RBC # BLD AUTO: 4.13 X10E6/UL (ref 3.77–5.28)
SODIUM SERPL-SCNC: 141 MMOL/L (ref 134–144)
WBC # BLD AUTO: 8.7 X10E3/UL (ref 3.4–10.8)

## 2018-04-12 NOTE — PROGRESS NOTES
Subjective:     Chief Complaint   Patient presents with    Anorexia        She  is a 25y.o. year old female with anorexia disorder who presents with her friend for one week follow up. Patient states that since Thursday she has started eating 5 meal again. She feels more energetic. States that she has been sleeping okay. Has been having BM daily, not using any stool softener.      She has been following up with nutritionist and psychotherapist regularly.     Denies any chest pain, soa, abdominal pain. She did experience palpitation last week when she was doing some furniture rearrangement.         Pertinent items are noted in HPI. Objective:     Vitals:    04/11/18 1454   BP: 107/65   Pulse: 60   Resp: 18   Temp: 98.1 °F (36.7 °C)   TempSrc: Temporal   SpO2: 98%   Weight: 105 lb 12.8 oz (48 kg)   Height: 5' 4.5\" (1.638 m)       Physical Examination: General appearance - alert, well appearing, and in no distress, oriented to person, place, and time and melissa appearence. Mental status - alert, oriented to person, place, and time, normal mood, behavior, speech, dress, motor activity, and thought processes  Ears - bilateral TM's and external ear canals normal  Nose - normal and patent, no erythema, discharge or polyps  Mouth - mucous membranes moist, pharynx normal without lesions  Neck - supple, no significant adenopathy  Chest - clear to auscultation, no wheezes, rales or rhonchi, symmetric air entry  Heart - normal rate and regular rhythm. Grade 1-2 murmur noted.     Allergies   Allergen Reactions    Nitrous Oxide Anaphylaxis     HAD TO BE BAGGED IN DENTIST OFFICE PER MOTHER    Other Medication Anaphylaxis     Nitrous Oxide      Septocaine [Articaine-Epinephrine] Anaphylaxis     HAD TO BE BAGGED IN DENTIST OFFICE PER MOTHER      Social History     Social History    Marital status: SINGLE     Spouse name: N/A    Number of children: N/A    Years of education: N/A     Social History Main Topics    Smoking status: Never Smoker    Smokeless tobacco: Never Used    Alcohol use No    Drug use: Yes     Special: Marijuana    Sexual activity: Yes     Partners: Male     Other Topics Concern    None     Social History Narrative    ** Merged History Encounter **           Family History   Problem Relation Age of Onset    No Known Problems Father     Stroke Maternal Grandmother     Cancer Maternal Grandmother      Lung    Heart Disease Maternal Grandfather     Hypertension Maternal Grandfather     Cancer Maternal Grandfather      Brain    Hypertension Paternal Grandmother     Other Paternal Grandmother      ALLERGY TO NOVACAINE    Heart Disease Paternal Grandfather     Other Paternal Grandfather      DIALYSIS      Past Surgical History:   Procedure Laterality Date    HX HEENT      ORAL SURGERY-LOCAL WITH NITROUS OXIDE    HX OTHER SURGICAL      Wart removed from L arm. Past Medical History:   Diagnosis Date    Anxiety 3/27/2014    Depression 3/27/2014    Depression, major, single episode, mild ([facility] [address] 3/27/2014    Dysmenorrhea 2/26/2016    Eczema     History of seasonal allergies     Insomnia 3/27/2014      Current Outpatient Prescriptions   Medication Sig Dispense Refill    escitalopram oxalate (LEXAPRO) 10 mg tablet Take 1 Tab by mouth daily. 30 Tab 2    traZODone (DESYREL) 50 mg tablet Take 1 Tab by mouth nightly. 30 Tab 2        Assessment/ Plan:   Diagnoses and all orders for this visit:    1. Anorexia  -    Patient lost weight. I am concern that she may not been following the meal plan as prescribed by nutritionist.  Russ Leisure patient that if things are not better next time then she should reconsider about inpatient help. She voiced understanding.   -    CBC WITH AUTOMATED DIFF  -     METABOLIC PANEL, COMPREHENSIVE  -     PHOSPHORUS  -     MAGNESIUM  -     escitalopram oxalate (LEXAPRO) 10 mg tablet; Take 1 Tab by mouth daily. 2. Anxiety  -   Continue.    escitalopram oxalate (LEXAPRO) 10 mg tablet; Take 1 Tab by mouth daily. 3. Heart murmur  -    Patient states that she had h/o murmur when she was young. I did not hear any murmur in last visits though. -    2D ECHO COMPLETE ADULT (TTE) W OR WO CONTR; Future       Spent > 50 % time counseling. Medication risks/benefits/costs/interactions/alternatives discussed with patient. Advised patient to call back or return to office if symptoms worsen/change/persist. If patient cannot reach us or should anything more severe/urgent arise he/she should proceed directly to the nearest emergency department. Discussed expected course/resolution/complications of diagnosis in detail with patient. Patient given a written after visit summary which includes her diagnoses, current medications and vitals. Patient expressed understanding with the diagnosis and plan. Follow-up Disposition:  Return in about 1 week (around 4/18/2018).

## 2018-04-18 ENCOUNTER — OFFICE VISIT (OUTPATIENT)
Dept: INTERNAL MEDICINE CLINIC | Age: 23
End: 2018-04-18

## 2018-04-18 VITALS
SYSTOLIC BLOOD PRESSURE: 120 MMHG | DIASTOLIC BLOOD PRESSURE: 73 MMHG | HEIGHT: 65 IN | OXYGEN SATURATION: 100 % | RESPIRATION RATE: 18 BRPM | WEIGHT: 108.6 LBS | BODY MASS INDEX: 18.09 KG/M2 | HEART RATE: 60 BPM | TEMPERATURE: 97.5 F

## 2018-04-18 DIAGNOSIS — F41.9 ANXIETY: ICD-10-CM

## 2018-04-18 DIAGNOSIS — G47.01 INSOMNIA DUE TO MEDICAL CONDITION: ICD-10-CM

## 2018-04-18 DIAGNOSIS — R63.0 ANOREXIA: Primary | ICD-10-CM

## 2018-04-18 NOTE — LETTER
4/19/2018 10:47 AM 
 
Ms. Kari Hamilton 33 [address] [facility] Dear [facility]: Please find your most recent results below. Resulted Orders CBC WITH AUTOMATED DIFF Result Value Ref Range WBC 7.8 3.4 - 10.8 x10E3/uL  
 RBC 3.95 3.77 - 5.28 x10E6/uL HGB 12.0 11.1 - 15.9 g/dL HCT 36.1 34.0 - 46.6 % MCV 91 79 - 97 fL  
 MCH 30.4 26.6 - 33.0 pg  
 MCHC 33.2 31.5 - 35.7 g/dL  
 RDW 13.5 12.3 - 15.4 % PLATELET 787 655 - 787 x10E3/uL NEUTROPHILS 71 Not Estab. % Lymphocytes 22 Not Estab. % MONOCYTES 5 Not Estab. % EOSINOPHILS 1 Not Estab. % BASOPHILS 1 Not Estab. %  
 ABS. NEUTROPHILS 5.5 1.4 - 7.0 x10E3/uL Abs Lymphocytes 1.8 0.7 - 3.1 x10E3/uL  
 ABS. MONOCYTES 0.4 0.1 - 0.9 x10E3/uL  
 ABS. EOSINOPHILS 0.1 0.0 - 0.4 x10E3/uL  
 ABS. BASOPHILS 0.1 0.0 - 0.2 x10E3/uL IMMATURE GRANULOCYTES 0 Not Estab. %  
 ABS. IMM. GRANS. 0.0 0.0 - 0.1 x10E3/uL Narrative Performed at:  [address]  [id number] : Alan John MD, Phone:  [phone] METABOLIC PANEL, COMPREHENSIVE Result Value Ref Range Glucose 83 65 - 99 mg/dL BUN 13 6 - 20 mg/dL Creatinine 0.67 0.57 - 1.00 mg/dL GFR est non- >59 mL/min/1.73 GFR est  >59 mL/min/1.73  
 BUN/Creatinine ratio 19 9 - 23 Sodium 140 134 - 144 mmol/L Potassium 4.7 3.5 - 5.2 mmol/L Chloride 98 96 - 106 mmol/L  
 CO2 23 18 - 29 mmol/L Calcium 10.1 8.7 - 10.2 mg/dL Protein, total 6.9 6.0 - 8.5 g/dL Albumin 4.6 3.5 - 5.5 g/dL GLOBULIN, TOTAL 2.3 1.5 - 4.5 g/dL A-G Ratio 2.0 1.2 - 2.2 Bilirubin, total 0.3 0.0 - 1.2 mg/dL Alk. phosphatase 50 39 - 117 IU/L  
 AST (SGOT) 21 0 - 40 IU/L  
 ALT (SGPT) 13 0 - 32 IU/L Narrative Performed at:  [address]  [id number] : Alan John MD, Phone:  [phone] MAGNESIUM Result Value Ref Range Magnesium 2.0 1.6 - 2.3 mg/dL Narrative Performed at:  [address]  [id number] : Deni Melgoza MD, Phone:  [phone] PHOSPHORUS Result Value Ref Range Phosphorus 4.2 2.5 - 4.5 mg/dL Narrative Performed at:  [address]  [id number] : Deni Melgoza MD, Phone:  [phone] RECOMMENDATIONS: 
 
CBC, kidney, liver, phosphorus level, magnesium level is normal. Keep up the good work! Please call me if you have any questions: [phone] Sincerely, 
 
 
Sho Sparks MD

## 2018-04-18 NOTE — MR AVS SNAPSHOT
[address] [place], [place] [address]Nessa Young 26 [address] [address] [place] [address] 
[phone] Patient: Whit Santana MRN: [id number] :1995 Visit Information Date & Time Provider Department Dept. Phone Encounter #  
 2018  3:30 PM Samy Sal MD [facility] Internal Medicine [phone] [id number] Follow-up Instructions Return in about 1 week (around 2018). Upcoming Health Maintenance Date Due DTaP/Tdap/Td series (6 - Td) 2016 PAP AKA CERVICAL CYTOLOGY 2016 Allergies as of 2018  Review Complete On: 2018 By: Aneta Gar LPN Severity Noted Reaction Type Reactions Nitrous Oxide High 2013    Anaphylaxis HAD TO BE BAGGED IN DENTIST OFFICE PER MOTHER Other Medication High 2013    Anaphylaxis Nitrous Oxide Septocaine [Articaine-epinephrine] High 2013    Anaphylaxis HAD TO BE BAGGED IN DENTIST OFFICE PER MOTHER Current Immunizations  Reviewed on 2013 Name Date DTAP Vaccine 1996, 1996, 1996, 1995, 1995 HIB Vaccine 10/4/1996, 1996, 1995, 1995 HPV 2010, 2009 Hepatitis B Vaccine 3/25/1996, 1995, 1995 Human Papillomavirus 2009 IPV 2000, 1996, 1995, 1995 MMR Vaccine 2000, 10/4/1996 Meningococcal Vaccine 2008 PPD 1/10/2013  4:01 PM  
 TDAP Vaccine 2006 Varicella Virus Vaccine 1/10/2013  4:01 PM  
 Varicella Virus Vaccine Live 1996 Not reviewed this visit You Were Diagnosed With   
  
 Codes Comments Anorexia    -  Primary ICD-10-CM: R63.0 ICD-9-CM: 783.0 Anxiety     ICD-10-CM: F41.9 ICD-9-CM: 300.00 Insomnia due to medical condition     ICD-10-CM: G47.01 
ICD-9-CM: 327.01 Vitals BP Pulse Temp Resp Height(growth percentile) Weight(growth percentile) 120/73 (BP 1 Location: Left arm, BP Patient Position: Sitting) 60 97.5 °F (36.4 °C) (Temporal) 18 5' 4.5\" (1.638 m) 108 lb 9.6 oz (49.3 kg) LMP SpO2 BMI OB Status Smoking Status 04/01/2018 100% 18.35 kg/m2 Having regular periods Never Smoker Vitals History BMI and BSA Data Body Mass Index Body Surface Area  
 18.35 kg/m 2 1.5 m 2 Preferred Pharmacy Pharmacy Name Phone [id number]. [phone] Your Updated Medication List  
  
   
This list is accurate as of 4/18/18  4:02 PM.  Always use your most recent med list.  
  
  
  
  
 escitalopram oxalate 10 mg tablet Commonly known as:  Amanda Calk Take 1 Tab by mouth daily. traZODone 50 mg tablet Commonly known as:  Justin Kaplanon Take 1 Tab by mouth nightly. We Performed the Following CBC WITH AUTOMATED DIFF [34232 CPT(R)] MAGNESIUM [id number] CPT(R)] METABOLIC PANEL, COMPREHENSIVE [66321 CPT(R)] PHOSPHORUS [49775 CPT(R)] Follow-up Instructions Return in about 1 week (around 4/25/2018). To-Do List   
 04/26/2018 11:30 AM  
(Arrive by 11:00 AM) Appointment with ECHO LAB [facility]VAS[facility] CARD ([phone]) Please be prepared to remove everything from the waist up and put on a gown. Introducing [facility] & HEALTH SERVICES! Lynn Nayak introduces [employer] patient portal. Now you can access parts of your medical record, email your doctor's office, and request medication refills online. 1. In your internet browser, go to [email or web address] 2. Click on the First Time User? Click Here link in the Sign In box. You will see the New Member Sign Up page. 3. Enter your [employer] Access Code exactly as it appears below. You will not need to use this code after youve completed the sign-up process. If you do not sign up before the expiration date, you must request a new code. · [employer] Access Code: [email or web address] Expires: 5/23/2018  3:57 PM 
 
4. Enter the last four digits of your Social Security Number (xxxx) and Date of Birth (mm/dd/yyyy) as indicated and click Submit. You will be taken to the next sign-up page. 5. Create a AppSheet ID. This will be your AppSheet login ID and cannot be changed, so think of one that is secure and easy to remember. 6. Create a [employer] password. You can change your password at any time. 7. Enter your Password Reset Question and Answer. This can be used at a later time if you forget your password. 8. Enter your e-mail address. You will receive e-mail notification when new information is available in [address] 9. Click Sign Up. You can now view and download portions of your medical record. 10. Click the Download Summary menu link to download a portable copy of your medical information. If you have questions, please visit the Frequently Asked Questions section of the [employer] website. Remember, AppSheet is NOT to be used for urgent needs. For medical emergencies, dial 911. Now available from your iPhone and Android! Please provide this summary of care documentation to your next provider. If you have any questions after today's visit, please call [phone].

## 2018-04-19 ENCOUNTER — TELEPHONE (OUTPATIENT)
Dept: INTERNAL MEDICINE CLINIC | Age: 23
End: 2018-04-19

## 2018-04-19 LAB
ALBUMIN SERPL-MCNC: 4.6 G/DL (ref 3.5–5.5)
ALBUMIN/GLOB SERPL: 2 {RATIO} (ref 1.2–2.2)
ALP SERPL-CCNC: 50 IU/L (ref 39–117)
ALT SERPL-CCNC: 13 IU/L (ref 0–32)
AST SERPL-CCNC: 21 IU/L (ref 0–40)
BASOPHILS # BLD AUTO: 0.1 X10E3/UL (ref 0–0.2)
BASOPHILS NFR BLD AUTO: 1 %
BILIRUB SERPL-MCNC: 0.3 MG/DL (ref 0–1.2)
BUN SERPL-MCNC: 13 MG/DL (ref 6–20)
BUN/CREAT SERPL: 19 (ref 9–23)
CALCIUM SERPL-MCNC: 10.1 MG/DL (ref 8.7–10.2)
CHLORIDE SERPL-SCNC: 98 MMOL/L (ref 96–106)
CO2 SERPL-SCNC: 23 MMOL/L (ref 18–29)
CREAT SERPL-MCNC: 0.67 MG/DL (ref 0.57–1)
EOSINOPHIL # BLD AUTO: 0.1 X10E3/UL (ref 0–0.4)
EOSINOPHIL NFR BLD AUTO: 1 %
ERYTHROCYTE [DISTWIDTH] IN BLOOD BY AUTOMATED COUNT: 13.5 % (ref 12.3–15.4)
GFR SERPLBLD CREATININE-BSD FMLA CKD-EPI: 125 ML/MIN/1.73
GFR SERPLBLD CREATININE-BSD FMLA CKD-EPI: 144 ML/MIN/1.73
GLOBULIN SER CALC-MCNC: 2.3 G/DL (ref 1.5–4.5)
GLUCOSE SERPL-MCNC: 83 MG/DL (ref 65–99)
HCT VFR BLD AUTO: 36.1 % (ref 34–46.6)
HGB BLD-MCNC: 12 G/DL (ref 11.1–15.9)
IMM GRANULOCYTES # BLD: 0 X10E3/UL (ref 0–0.1)
IMM GRANULOCYTES NFR BLD: 0 %
LYMPHOCYTES # BLD AUTO: 1.8 X10E3/UL (ref 0.7–3.1)
LYMPHOCYTES NFR BLD AUTO: 22 %
MAGNESIUM SERPL-MCNC: 2 MG/DL (ref 1.6–2.3)
MCH RBC QN AUTO: 30.4 PG (ref 26.6–33)
MCHC RBC AUTO-ENTMCNC: 33.2 G/DL (ref 31.5–35.7)
MCV RBC AUTO: 91 FL (ref 79–97)
MONOCYTES # BLD AUTO: 0.4 X10E3/UL (ref 0.1–0.9)
MONOCYTES NFR BLD AUTO: 5 %
NEUTROPHILS # BLD AUTO: 5.5 X10E3/UL (ref 1.4–7)
NEUTROPHILS NFR BLD AUTO: 71 %
PHOSPHATE SERPL-MCNC: 4.2 MG/DL (ref 2.5–4.5)
PLATELET # BLD AUTO: 298 X10E3/UL (ref 150–379)
POTASSIUM SERPL-SCNC: 4.7 MMOL/L (ref 3.5–5.2)
PROT SERPL-MCNC: 6.9 G/DL (ref 6–8.5)
RBC # BLD AUTO: 3.95 X10E6/UL (ref 3.77–5.28)
SODIUM SERPL-SCNC: 140 MMOL/L (ref 134–144)
WBC # BLD AUTO: 7.8 X10E3/UL (ref 3.4–10.8)

## 2018-04-19 NOTE — PROGRESS NOTES
Subjective:     Chief Complaint   Patient presents with    Anorexia     1 wk f/u        She  is a 25y.o. year old female who presents today for on week follow up on anorexia. She states that she has done so well last week and this week. She has been eating 5 meals/day as advised by nutritionist. Gained two pounds this week. Wt Readings from Last 3 Encounters:   04/18/18 108 lb 9.6 oz (49.3 kg)   04/11/18 105 lb 12.8 oz (48 kg)   04/04/18 106 lb 9.6 oz (48.4 kg)     Anxiety is stable with Lexapro. Denies any depression. Has been taking trazodone for sleep. Some night she wakes up.   stopped exercise, does not take any laxatives. She had soft normal BM this morning. Denies any chest pain. She has scheduled for echo next week for cardiac murmur. Pertinent items are noted in HPI.   Objective:     Vitals:    04/18/18 1545   BP: 120/73   Pulse: 60   Resp: 18   Temp: 97.5 °F (36.4 °C)   TempSrc: Temporal   SpO2: 100%   Weight: 108 lb 9.6 oz (49.3 kg)   Height: 5' 4.5\" (1.638 m)       Physical Examination: General appearance - alert, well appearing, and in no distress, oriented to person, place, and time and improved  Mental status - alert, oriented to person, place, and time, normal mood, behavior, speech, dress, motor activity, and thought processes  Chest - clear to auscultation, no wheezes, rales or rhonchi, symmetric air entry  Heart - normal rate, regular rhythm, normal S1, S2, soft murmurs, no rubs, clicks or gallops  Abdomen - soft, nontender, nondistended, no masses or organomegaly    Allergies   Allergen Reactions    Nitrous Oxide Anaphylaxis     HAD TO BE BAGGED IN DENTIST OFFICE PER MOTHER    Other Medication Anaphylaxis     Nitrous Oxide      Septocaine [Articaine-Epinephrine] Anaphylaxis     HAD TO BE BAGGED IN DENTIST OFFICE PER MOTHER      Social History     Social History    Marital status: SINGLE     Spouse name: N/A    Number of children: N/A    Years of education: N/A     Social History Main Topics    Smoking status: Never Smoker    Smokeless tobacco: Never Used    Alcohol use No    Drug use: Yes     Special: Marijuana    Sexual activity: Yes     Partners: Male     Other Topics Concern    None     Social History Narrative    ** Merged History Encounter **           Family History   Problem Relation Age of Onset    No Known Problems Father     Stroke Maternal Grandmother     Cancer Maternal Grandmother      Lung    Heart Disease Maternal Grandfather     Hypertension Maternal Grandfather     Cancer Maternal Grandfather      Brain    Hypertension Paternal Grandmother     Other Paternal Grandmother      ALLERGY TO NOVACAINE    Heart Disease Paternal Grandfather     Other Paternal Grandfather      DIALYSIS      Past Surgical History:   Procedure Laterality Date    HX HEENT      ORAL SURGERY-LOCAL WITH NITROUS OXIDE    HX OTHER SURGICAL      Wart removed from L arm. Past Medical History:   Diagnosis Date    Anxiety 3/27/2014    Depression 3/27/2014    Depression, major, single episode, mild ([address] 3/27/2014    Dysmenorrhea 2/26/2016    Eczema     History of seasonal allergies     Insomnia 3/27/2014      Current Outpatient Prescriptions   Medication Sig Dispense Refill    escitalopram oxalate (LEXAPRO) 10 mg tablet Take 1 Tab by mouth daily. 30 Tab 2    traZODone (DESYREL) 50 mg tablet Take 1 Tab by mouth nightly. 30 Tab 2        Assessment/ Plan:   Diagnoses and all orders for this visit:    1. Anorexia  -     CBC WITH AUTOMATED DIFF  -     METABOLIC PANEL, COMPREHENSIVE  -     MAGNESIUM  -     PHOSPHORUS        -      Improved from last time. Gained 2 pounds. She assure me that she will continue work on eating as prescribed by dietician. I did talk about inpatient Rx option with her today. She is worry that her insurance will not cover the expense. Advised to talk with the insurance company and find out. 2. Anxiety        - continue Lexapro.  Will consider increasing the dose as needed. 3. Insomnia due to medical condition       - continue Trazodone. - pt states that she has stopped buying Marijuana for the past one week. Encouraged to continue stop using Marijuana. Medication risks/benefits/costs/interactions/alternatives discussed with patient. Advised patient to call back or return to office if symptoms worsen/change/persist. If patient cannot reach us or should anything more severe/urgent arise he/she should proceed directly to the nearest emergency department. Discussed expected course/resolution/complications of diagnosis in detail with patient. Patient given a written after visit summary which includes her diagnoses, current medications and vitals. Patient expressed understanding with the diagnosis and plan. Follow-up Disposition:  Return in about 1 week (around 4/25/2018).

## 2018-04-19 NOTE — TELEPHONE ENCOUNTER
Roland Mayfield called to discuss how Ramandeep's appointment went yesterday. Do you want to give her another week of outpatient or would she benefit from inpatient? Call at 1, 2:30-4pm or call back and leave voicemail.

## 2018-04-25 ENCOUNTER — TELEPHONE (OUTPATIENT)
Dept: INTERNAL MEDICINE CLINIC | Age: 23
End: 2018-04-25

## 2018-04-25 NOTE — TELEPHONE ENCOUNTER
Called and stated pt is doing good this week, ate 100% of her meal plan, increased calories, weight up 1.109 and are giving it another week

## 2018-04-26 ENCOUNTER — HOSPITAL ENCOUNTER (OUTPATIENT)
Dept: NON INVASIVE DIAGNOSTICS | Age: 23
Discharge: HOME | End: 2018-04-26
Attending: FAMILY MEDICINE
Payer: COMMERCIAL

## 2018-04-26 DIAGNOSIS — R01.1 HEART MURMUR: ICD-10-CM

## 2018-04-26 PROCEDURE — 93306 TTE W/DOPPLER COMPLETE: CPT

## 2018-04-26 NOTE — LETTER
2018 4:54 PM 
 
Ms. Mauricio Lanier 14 [facility] 7 [address] Dear Mauricio Lanier: Please find your most recent results below. Cardiac Echo is fairly normal. Nothing significant Resulted Orders 2D ECHO COMPLETE ADULT (TTE) W OR WO CONTR Narrative [facility] [address], [address] 
[phone] Transthoracic Echocardiogram 
 
Patient: Ryann Carrillo 
MRN: [id number] ACCT #: [de-identified] : 1995 Age: 25 years Gender: Female Height: 64 in 
Weight: 107.8 lb 
BSA: 1.51 mï¾² BP: 125 / 76 mmHg Study date: 2018 Status: Routine Location: Out-patient area [facility] #: [id number] Allergies: NITROUS OXIDE, OTHER MEDICATION, ARTICAINE-EPINEPHRINE Reading Group:  *CAV Group Technologist:  Georgina Albrecht RDCS Reading Physician:  Roseann Marie. Mohinder Medel MD 
 
SUMMARY: 
Left ventricle: Systolic function was normal. Ejection fraction was 
estimated in the range of 55 % to 60 %. There were no regional wall motion 
abnormalities. Tricuspid valve: There was mild regurgitation. INDICATIONS: Evaluation of cardiac murmur. PROCEDURE: This was a routine study. The study included complete 2D 
imaging, M-mode, complete spectral Doppler, and color Doppler. The heart 
rate was 55 bpm, at the start of the study. Systolic blood pressure was 125 mmHg, at the start of the study. Diastolic blood pressure was 76 mmHg, 
at the start of the study. Image quality was adequate. LEFT VENTRICLE: Size was normal. Systolic function was normal. Ejection 
fraction was estimated in the range of 55 % to 60 %. There were no 
regional wall motion abnormalities. Wall thickness was normal. 
 
RIGHT VENTRICLE: The size was normal. Systolic function was normal. Wall 
thickness was normal. 
 
LEFT ATRIUM: Size was normal. 
 
RIGHT ATRIUM: Size was normal. 
 
MITRAL VALVE: Normal valve structure. There was normal leaflet separation. DOPPLER: The transmitral velocity was within the normal range. There was 
no evidence for stenosis. There was no regurgitation. AORTIC VALVE: The valve was trileaflet. Leaflets exhibited normal cuspal 
separation and good mobility. DOPPLER: There was no stenosis. There was no 
significant regurgitation. TRICUSPID VALVE: Normal valve structure. DOPPLER: There was mild 
regurgitation. Pulmonary artery systolic pressure was within the normal 
range. PULMONIC VALVE: Not well visualized, but normal Doppler findings. AORTA: The root exhibited normal size. PERICARDIUM: There was no pericardial effusion. SYSTEM MEASUREMENT TABLES 
 
2D Ao Diam: 2.5 cm 
LA Diam: 2.8 cm LAAs A2C: 17.9 cm2 LAAs A4C: 15.6 cm2 LAESV A-L A2C: 55.9 ml 
LAESV A-L A4C: 46 ml 
LAESV Index (A-L): 34.8 ml/m2 LAESV MOD A2C: 49.6 ml 
LAESV MOD A4C: 40.4 ml 
LAESV(A-L): 52.6 ml 
LALs A2C: 4.9 cm LALs A4C: 4.5 cm 
LVOT Diam: 1.6 cm 
%FS: 35 % EDV(Teich): 98.8 ml 
EF(Teich): 64.3 % 
ESV(Teich): 35.3 ml IVSd: 0.9 cm LVIDd: 4.6 cm 
LVIDs: 3 cm 
LVPWd: 1 cm 
SV(Teich): 63.5 ml 
 
CW 
AV Env. Ti: 331.1 ms 
AV VTI: 27.3 cm 
AV Vmax: 1.3 m/s AV Vmean: 0.8 m/s AV maxP mmHg AV meanPG: 3.2 mmHg PV Vmax: 1 m/s PV maxP mmHg TR Vmax: 2.7 m/s 
TR maxP.2 mmHg MM 
TAPSE: 2.1 cm 
 
PW 
IAN (VTI): 1.6 cm2 IAN Vmax: 1.5 cm2 AVAI (VTI): 0 cm2/m2 AVAI Vmax: 0 cm2/m2 LVOT Env. Ti: 327.3 ms 
LVOT VTI: 21.7 cm 
LVOT Vmax: 1 m/s LVOT Vmean: 0.7 m/s LVOT maxPG: 3.9 mmHg LVOT meanP mmHg E' Lat: 0 m/s 
E' Sept: 0.2 m/s 
E/E' Lat: 28.5 E/E' Sept: 7.3 MV A Raymond: 0.4 m/s 
MV Dec Cavalier: 6.5 m/s2 MV DecT: 171.8 ms 
MV E Raymond: 1.1 m/s 
MV E/A Ratio: 2.7 MV PHT: 49.8 ms 
MVA By PHT: 4.4 cm2 LVSI Dopp: 29.6 ml/m2 LVSV Dopp: 44.8 ml Prepared and E-signed by 
 
Dimple Feng. Fiorella Rabago MD 
Signed 2018 16:32:36 RECOMMENDATIONS: 
 
None. Keep up the good work! \" Please call me if you have any questions: [phone] Sincerely, Echo Lab 1 [facility]

## 2018-05-02 ENCOUNTER — OFFICE VISIT (OUTPATIENT)
Dept: INTERNAL MEDICINE CLINIC | Age: 23
End: 2018-05-02

## 2018-05-02 VITALS
HEIGHT: 65 IN | WEIGHT: 108.4 LBS | TEMPERATURE: 98.3 F | SYSTOLIC BLOOD PRESSURE: 110 MMHG | HEART RATE: 80 BPM | OXYGEN SATURATION: 100 % | RESPIRATION RATE: 18 BRPM | DIASTOLIC BLOOD PRESSURE: 80 MMHG | BODY MASS INDEX: 18.06 KG/M2

## 2018-05-02 DIAGNOSIS — R63.0 ANOREXIA: Primary | ICD-10-CM

## 2018-05-02 DIAGNOSIS — F41.9 ANXIETY: ICD-10-CM

## 2018-05-02 NOTE — MR AVS SNAPSHOT
[address] [place], [place] [address]Nessa Young 26 350 [facility] 
[phone] Patient: Mauricio Lanier MRN: [id number] :1995 Visit Information Date & Time Provider Department Dept. Phone Encounter #  
 2018  3:30 PM Carlos Cisneros MD [facility] Internal Medicine [phone] [id number] Follow-up Instructions Return in about 2 weeks (around 2018). Upcoming Health Maintenance Date Due DTaP/Tdap/Td series (6 - Td) 2016 PAP AKA CERVICAL CYTOLOGY 2016 Influenza Age 5 to Adult 2018 Allergies as of 2018  Review Complete On: 2018 By: Charles Montoya MD  
  
 Severity Noted Reaction Type Reactions Nitrous Oxide High 2013    Anaphylaxis HAD TO BE BAGGED IN DENTIST OFFICE PER MOTHER Other Medication High 2013    Anaphylaxis Nitrous Oxide Septocaine [Articaine-epinephrine] High 2013    Anaphylaxis HAD TO BE BAGGED IN DENTIST OFFICE PER MOTHER Current Immunizations  Reviewed on 2013 Name Date DTAP Vaccine 1996, 1996, 1996, 1995, 1995 HIB Vaccine 10/4/1996, 1996, 1995, 1995 HPV 2010, 2009 Hepatitis B Vaccine 3/25/1996, 1995, 1995 Human Papillomavirus 2009 IPV 2000, 1996, 1995, 1995 MMR Vaccine 2000, 10/4/1996 Meningococcal Vaccine 2008 PPD 1/10/2013  4:01 PM  
 TDAP Vaccine 2006 Varicella Virus Vaccine 1/10/2013  4:01 PM  
 Varicella Virus Vaccine Live 1996 Not reviewed this visit You Were Diagnosed With   
  
 Codes Comments Anorexia    -  Primary ICD-10-CM: R63.0 ICD-9-CM: 386. 0 Vitals BP Pulse Temp Resp Height(growth percentile) Weight(growth percentile) 110/80 80 98.3 °F (36.8 °C) (Temporal) 18 5' 4.5\" (1.638 m) 108 lb 6.4 oz (49.2 kg) SpO2 BMI OB Status Smoking Status 100% 18.32 kg/m2 Having regular periods Never Smoker Vitals History BMI and BSA Data Body Mass Index Body Surface Area  
 18.32 kg/m 2 1.5 m 2 Preferred Pharmacy Pharmacy Name Phone [id number], [address] [phone] Your Updated Medication List  
  
   
This list is accurate as of 5/2/18  4:00 PM.  Always use your most recent med list.  
  
  
  
  
 escitalopram oxalate 10 mg tablet Commonly known as:  Leanne Narrow Take 1 Tab by mouth daily. traZODone 50 mg tablet Commonly known as:  Luz Hesselbach Take 1 Tab by mouth nightly. We Performed the Following CBC WITH AUTOMATED DIFF [12744 CPT(R)] MAGNESIUM [id number] CPT(R)] METABOLIC PANEL, COMPREHENSIVE [41007 CPT(R)] PHOSPHORUS [66658 CPT(R)] Follow-up Instructions Return in about 2 weeks (around 5/16/2018). Introducing [facility] & HEALTH SERVICES! Danielle Shafer introduces [employer] patient portal. Now you can access parts of your medical record, email your doctor's office, and request medication refills online. 1. In your internet browser, go to [email or web address] 2. Click on the First Time User? Click Here link in the Sign In box. You will see the New Member Sign Up page. 3. Enter your [employer] Access Code exactly as it appears below. You will not need to use this code after youve completed the sign-up process. If you do not sign up before the expiration date, you must request a new code. · [employer] Access Code: [email or web address] Expires: 5/23/2018  3:57 PM 
 
4. Enter the last four digits of your Social Security Number (xxxx) and Date of Birth (mm/dd/yyyy) as indicated and click Submit. You will be taken to the next sign-up page. 5. Create a [employer] ID. This will be your [employer] login ID and cannot be changed, so think of one that is secure and easy to remember. 6. Create a [employer]Open password. You can change your password at any time. 7. Enter your Password Reset Question and Answer. This can be used at a later time if you forget your password. 8. Enter your e-mail address. You will receive e-mail notification when new information is available in [address] 9. Click Sign Up. You can now view and download portions of your medical record. 10. Click the Download Summary menu link to download a portable copy of your medical information. If you have questions, please visit the Frequently Asked Questions section of the [employer] website. Remember, [employer] is NOT to be used for urgent needs. For medical emergencies, dial 911. Now available from your iPhone and Android! Please provide this summary of care documentation to your next provider. Your primary care clinician is listed as Carlos Sainz. If you have any questions after today's visit, please call [phone].

## 2018-05-02 NOTE — LETTER
5/3/2018 1:52 PM 
 
Ms. Soren Conner [address] [place] [address] Dear Soren Conner: Please find your most recent results below. Resulted Orders CBC WITH AUTOMATED DIFF Result Value Ref Range WBC 5.3 3.4 - 10.8 x10E3/uL  
 RBC 4.02 3.77 - 5.28 x10E6/uL HGB 12.2 11.1 - 15.9 g/dL HCT 36.1 34.0 - 46.6 % MCV 90 79 - 97 fL  
 MCH 30.3 26.6 - 33.0 pg  
 MCHC 33.8 31.5 - 35.7 g/dL  
 RDW 13.5 12.3 - 15.4 % PLATELET 892 840 - 315 x10E3/uL NEUTROPHILS 57 Not Estab. % Lymphocytes 31 Not Estab. % MONOCYTES 7 Not Estab. % EOSINOPHILS 3 Not Estab. % BASOPHILS 2 Not Estab. %  
 ABS. NEUTROPHILS 3.1 1.4 - 7.0 x10E3/uL Abs Lymphocytes 1.7 0.7 - 3.1 x10E3/uL  
 ABS. MONOCYTES 0.4 0.1 - 0.9 x10E3/uL  
 ABS. EOSINOPHILS 0.2 0.0 - 0.4 x10E3/uL  
 ABS. BASOPHILS 0.1 0.0 - 0.2 x10E3/uL IMMATURE GRANULOCYTES 0 Not Estab. %  
 ABS. IMM. GRANS. 0.0 0.0 - 0.1 x10E3/uL Narrative Performed at:  [address]  [id number] : Bambi Galvin MD, Phone:  [phone] PHOSPHORUS Result Value Ref Range Phosphorus 3.0 2.5 - 4.5 mg/dL Narrative Performed at:  [address]  [id number] : Bambi Galvin MD, Phone:  [phone] MAGNESIUM Result Value Ref Range Magnesium 1.9 1.6 - 2.3 mg/dL Narrative Performed at:  [address]  [id number] : Bambi Galvin MD, Phone:  [phone] METABOLIC PANEL, COMPREHENSIVE Result Value Ref Range Glucose 86 65 - 99 mg/dL BUN 13 6 - 20 mg/dL Creatinine 0.76 0.57 - 1.00 mg/dL GFR est non- >59 mL/min/1.73 GFR est  >59 mL/min/1.73  
 BUN/Creatinine ratio 17 9 - 23 Sodium 140 134 - 144 mmol/L Potassium 4.2 3.5 - 5.2 mmol/L Chloride 98 96 - 106 mmol/L  
 CO2 26 18 - 29 mmol/L Calcium 10.0 8.7 - 10.2 mg/dL Protein, total 7.2 6.0 - 8.5 g/dL Albumin 5.0 3.5 - 5.5 g/dL GLOBULIN, TOTAL 2.2 1.5 - 4.5 g/dL A-G Ratio 2.3 (H) 1.2 - 2.2 Bilirubin, total 0.6 0.0 - 1.2 mg/dL Alk. phosphatase 56 39 - 117 IU/L  
 AST (SGOT) 25 0 - 40 IU/L  
 ALT (SGPT) 20 0 - 32 IU/L Narrative Performed at:  [address]  [id number] : Melissa Alvarez MD, Phone:  [phone] RECOMMENDATIONS: 
 
CBC, kidney, liver. Phosphorus, magnesium level is good. Please call me if you have any questions: [phone] Sincerely, 
 
 
Sofie Nageotte, MD

## 2018-05-03 LAB
ALBUMIN SERPL-MCNC: 5 G/DL (ref 3.5–5.5)
ALBUMIN/GLOB SERPL: 2.3 {RATIO} (ref 1.2–2.2)
ALP SERPL-CCNC: 56 IU/L (ref 39–117)
ALT SERPL-CCNC: 20 IU/L (ref 0–32)
AST SERPL-CCNC: 25 IU/L (ref 0–40)
BASOPHILS # BLD AUTO: 0.1 X10E3/UL (ref 0–0.2)
BASOPHILS NFR BLD AUTO: 2 %
BILIRUB SERPL-MCNC: 0.6 MG/DL (ref 0–1.2)
BUN SERPL-MCNC: 13 MG/DL (ref 6–20)
BUN/CREAT SERPL: 17 (ref 9–23)
CALCIUM SERPL-MCNC: 10 MG/DL (ref 8.7–10.2)
CHLORIDE SERPL-SCNC: 98 MMOL/L (ref 96–106)
CO2 SERPL-SCNC: 26 MMOL/L (ref 18–29)
CREAT SERPL-MCNC: 0.76 MG/DL (ref 0.57–1)
EOSINOPHIL # BLD AUTO: 0.2 X10E3/UL (ref 0–0.4)
EOSINOPHIL NFR BLD AUTO: 3 %
ERYTHROCYTE [DISTWIDTH] IN BLOOD BY AUTOMATED COUNT: 13.5 % (ref 12.3–15.4)
GFR SERPLBLD CREATININE-BSD FMLA CKD-EPI: 112 ML/MIN/1.73
GFR SERPLBLD CREATININE-BSD FMLA CKD-EPI: 129 ML/MIN/1.73
GLOBULIN SER CALC-MCNC: 2.2 G/DL (ref 1.5–4.5)
GLUCOSE SERPL-MCNC: 86 MG/DL (ref 65–99)
HCT VFR BLD AUTO: 36.1 % (ref 34–46.6)
HGB BLD-MCNC: 12.2 G/DL (ref 11.1–15.9)
IMM GRANULOCYTES # BLD: 0 X10E3/UL (ref 0–0.1)
IMM GRANULOCYTES NFR BLD: 0 %
LYMPHOCYTES # BLD AUTO: 1.7 X10E3/UL (ref 0.7–3.1)
LYMPHOCYTES NFR BLD AUTO: 31 %
MAGNESIUM SERPL-MCNC: 1.9 MG/DL (ref 1.6–2.3)
MCH RBC QN AUTO: 30.3 PG (ref 26.6–33)
MCHC RBC AUTO-ENTMCNC: 33.8 G/DL (ref 31.5–35.7)
MCV RBC AUTO: 90 FL (ref 79–97)
MONOCYTES # BLD AUTO: 0.4 X10E3/UL (ref 0.1–0.9)
MONOCYTES NFR BLD AUTO: 7 %
NEUTROPHILS # BLD AUTO: 3.1 X10E3/UL (ref 1.4–7)
NEUTROPHILS NFR BLD AUTO: 57 %
PHOSPHATE SERPL-MCNC: 3 MG/DL (ref 2.5–4.5)
PLATELET # BLD AUTO: 308 X10E3/UL (ref 150–379)
POTASSIUM SERPL-SCNC: 4.2 MMOL/L (ref 3.5–5.2)
PROT SERPL-MCNC: 7.2 G/DL (ref 6–8.5)
RBC # BLD AUTO: 4.02 X10E6/UL (ref 3.77–5.28)
SODIUM SERPL-SCNC: 140 MMOL/L (ref 134–144)
WBC # BLD AUTO: 5.3 X10E3/UL (ref 3.4–10.8)

## 2018-05-03 NOTE — PROGRESS NOTES
Subjective:     Chief Complaint   Patient presents with    Eating Disorder        She  is a 25y.o. year old female presents today for on week follow up on anorexia. She states that she has been doing well . Has been eating 5 meals/day as advised by nutritionist as well as added another snacks this last week. States that she has been sleeping well. Anxiety has been well controlled.      She states that right before she came in to the clinic she found out that her best friend is hospitalized. She is worry about that otherwise has been feeling well. Anxiety is stable with Lexapro. Denies any depression. Has been taking trazodone for sleep. Some night she wakes up.   stopped exercise, does not take any laxatives. Denies any chest pain.     Wt Readings from Last 3 Encounters:   05/02/18 108 lb 6.4 oz (49.2 kg)   04/18/18 108 lb 9.6 oz (49.3 kg)   04/11/18 105 lb 12.8 oz (48 kg)       Discussed echo result with patient today. Pertinent items are noted in HPI. Objective:     Vitals:    05/02/18 1540 05/02/18 1600   BP: (!) 153/95 110/80   Pulse: 80    Resp: 18    Temp: 98.3 °F (36.8 °C)    TempSrc: Temporal    SpO2: 100%    Weight: 108 lb 6.4 oz (49.2 kg)    Height: 5' 4.5\" (1.638 m)        Physical Examination: General appearance - alert, well appearing, and in no distress, oriented to person, place, and time and melissa appearance.   Mental status - alert, oriented to person, place, and time    Chest - clear to auscultation, no wheezes, rales or rhonchi, symmetric air entry  Heart - normal rate, regular rhythm, normal S1, S2, no murmurs, rubs, clicks or gallops    Allergies   Allergen Reactions    Nitrous Oxide Anaphylaxis     HAD TO BE BAGGED IN DENTIST OFFICE PER MOTHER    Other Medication Anaphylaxis     Nitrous Oxide      Septocaine [Articaine-Epinephrine] Anaphylaxis     HAD TO BE BAGGED IN DENTIST OFFICE PER MOTHER      Social History     Social History    Marital status: SINGLE     Spouse name: N/A  Number of children: N/A    Years of education: N/A     Social History Main Topics    Smoking status: Never Smoker    Smokeless tobacco: Never Used    Alcohol use No    Drug use: Yes     Special: Marijuana    Sexual activity: Yes     Partners: Male     Other Topics Concern    None     Social History Narrative    ** Merged History Encounter **           Family History   Problem Relation Age of Onset    No Known Problems Father     Stroke Maternal Grandmother     Cancer Maternal Grandmother      Lung    Heart Disease Maternal Grandfather     Hypertension Maternal Grandfather     Cancer Maternal Grandfather      Brain    Hypertension Paternal Grandmother     Other Paternal Grandmother      ALLERGY TO NOVACAINE    Heart Disease Paternal Grandfather     Other Paternal Grandfather      DIALYSIS      Past Surgical History:   Procedure Laterality Date    HX HEENT      ORAL SURGERY-LOCAL WITH NITROUS OXIDE    HX OTHER SURGICAL      Wart removed from L arm. Past Medical History:   Diagnosis Date    Anxiety 3/27/2014    Depression 3/27/2014    Depression, major, single episode, mild ([facility] [address] 3/27/2014    Dysmenorrhea 2/26/2016    Eczema     History of seasonal allergies     Insomnia 3/27/2014      Current Outpatient Prescriptions   Medication Sig Dispense Refill    escitalopram oxalate (LEXAPRO) 10 mg tablet Take 1 Tab by mouth daily. 30 Tab 2    traZODone (DESYREL) 50 mg tablet Take 1 Tab by mouth nightly. 30 Tab 2        Assessment/ Plan:   Diagnoses and all orders for this visit:    1. Anorexia  -     CBC WITH AUTOMATED DIFF  -     PHOSPHORUS  -     MAGNESIUM  -     METABOLIC PANEL, COMPREHENSIVE  Weight is stable. She assure me that she will continue work on eating as prescribed by dietician. . She assure me that she will continue work on eating as prescribed by dietician. Avoid taking any laxative. 2. Anxiety      - stable with lexapo.           Medication risks/benefits/costs/interactions/alternatives discussed with patient. Advised patient to call back or return to office if symptoms worsen/change/persist. If patient cannot reach us or should anything more severe/urgent arise he/she should proceed directly to the nearest emergency department. Discussed expected course/resolution/complications of diagnosis in detail with patient. Patient given a written after visit summary which includes her diagnoses, current medications and vitals. Patient expressed understanding with the diagnosis and plan. Follow-up Disposition:  Return in about 2 weeks (around 5/16/2018).

## 2018-05-16 ENCOUNTER — OFFICE VISIT (OUTPATIENT)
Dept: INTERNAL MEDICINE CLINIC | Age: 23
End: 2018-05-16

## 2018-05-16 VITALS
WEIGHT: 107.6 LBS | TEMPERATURE: 98.6 F | RESPIRATION RATE: 18 BRPM | OXYGEN SATURATION: 100 % | HEIGHT: 65 IN | HEART RATE: 76 BPM | DIASTOLIC BLOOD PRESSURE: 79 MMHG | BODY MASS INDEX: 17.93 KG/M2 | SYSTOLIC BLOOD PRESSURE: 136 MMHG

## 2018-05-16 DIAGNOSIS — L30.9 ECZEMA, UNSPECIFIED TYPE: ICD-10-CM

## 2018-05-16 DIAGNOSIS — F41.9 ANXIETY: ICD-10-CM

## 2018-05-16 DIAGNOSIS — R63.0 ANOREXIA: Primary | ICD-10-CM

## 2018-05-16 RX ORDER — ESCITALOPRAM OXALATE 20 MG/1
20 TABLET ORAL DAILY
Qty: 30 TAB | Refills: 2 | Status: SHIPPED | OUTPATIENT
Start: 2018-05-16 | End: 2018-08-13 | Stop reason: SDUPTHER

## 2018-05-16 RX ORDER — TRIAMCINOLONE ACETONIDE 0.25 MG/G
OINTMENT TOPICAL 2 TIMES DAILY
Qty: 30 G | Refills: 0 | Status: SHIPPED | OUTPATIENT
Start: 2018-05-16 | End: 2018-09-25 | Stop reason: SDUPTHER

## 2018-05-16 NOTE — PROGRESS NOTES
Subjective:     Chief Complaint   Patient presents with    Anorexia        She  is a 25y.o. year old female  old female presents today for two week follow up on anorexia. She states that she has been doing well . Has been eating 5 meals/day as advised by nutritionist as well as added another snacks this last week. States that she has been sleeping well.       Anxiety has been little worse lately due to some social stressor. Has been taking trazodone for sleep. Sleeping at least 8 Hrs and waking up fresh. stopped exercise, does not take any laxatives. Denies any chest pain, soa, abdominal pain. Hand eczema is worse. States that she does have habit of scratching her hands at night when sleeping which making things worse. Wt Readings from Last 3 Encounters:   05/16/18 107 lb 9.6 oz (48.8 kg)   05/02/18 108 lb 6.4 oz (49.2 kg)   04/18/18 108 lb 9.6 oz (49.3 kg)         Pertinent items are noted in HPI.   Objective:     Vitals:    05/16/18 1532   BP: 136/79   Pulse: 76   Resp: 18   Temp: 98.6 °F (37 °C)   TempSrc: Temporal   SpO2: 100%   Weight: 107 lb 9.6 oz (48.8 kg)   Height: 5' 4.5\" (1.638 m)       Physical Examination: General appearance - alert, well appearing, and in no distress, oriented to person, place, and time and melissa appearance  Mental status - alert, oriented to person, place, and time, normal mood, behavior, speech, dress, motor activity, and thought processes  Chest - clear to auscultation, no wheezes, rales or rhonchi, symmetric air entry  Heart - normal rate, regular rhythm, normal S1, S2, no murmurs, rubs, clicks or gallops    Allergies   Allergen Reactions    Nitrous Oxide Anaphylaxis     HAD TO BE BAGGED IN DENTIST OFFICE PER MOTHER    Other Medication Anaphylaxis     Nitrous Oxide      Septocaine [Articaine-Epinephrine] Anaphylaxis     HAD TO BE BAGGED IN DENTIST OFFICE PER MOTHER      Social History     Social History    Marital status: SINGLE     Spouse name: N/A    Number of children: N/A    Years of education: N/A     Social History Main Topics    Smoking status: Never Smoker    Smokeless tobacco: Never Used    Alcohol use No    Drug use: Yes     Special: Marijuana    Sexual activity: Yes     Partners: Male     Other Topics Concern    None     Social History Narrative    ** Merged History Encounter **           Family History   Problem Relation Age of Onset    No Known Problems Father     Stroke Maternal Grandmother     Cancer Maternal Grandmother      Lung    Heart Disease Maternal Grandfather     Hypertension Maternal Grandfather     Cancer Maternal Grandfather      Brain    Hypertension Paternal Grandmother     Other Paternal Grandmother      ALLERGY TO NOVACAINE    Heart Disease Paternal Grandfather     Other Paternal Grandfather      DIALYSIS      Past Surgical History:   Procedure Laterality Date    HX HEENT      ORAL SURGERY-LOCAL WITH NITROUS OXIDE    HX OTHER SURGICAL      Wart removed from L arm. Past Medical History:   Diagnosis Date    Anxiety 3/27/2014    Depression 3/27/2014    Depression, major, single episode, mild ([facility] [address] 3/27/2014    Dysmenorrhea 2/26/2016    Eczema     History of seasonal allergies     Insomnia 3/27/2014      Current Outpatient Prescriptions   Medication Sig Dispense Refill    escitalopram oxalate (LEXAPRO) 20 mg tablet Take 1 Tab by mouth daily. 30 Tab 2    triamcinolone acetonide (KENALOG) 0.025 % ointment Apply  to affected area two (2) times a day. use thin layer 30 g 0    traZODone (DESYREL) 50 mg tablet Take 1 Tab by mouth nightly. 30 Tab 2        Assessment/ Plan:   Diagnoses and all orders for this visit:    1. Anorexia  -  Lost few ounces in last two weeks  -     Increase  escitalopram oxalate (LEXAPRO) 20 mg tablet;  Take 1 Tab by mouth daily.  -     TSH AND FREE T4  -     METABOLIC PANEL, COMPREHENSIVE  -     MAGNESIUM  -     PHOSPHORUS  -     CBC WITH AUTOMATED DIFF        - continue follow up with nutritionist and therapist.   2. Anxiety  -    Increase  escitalopram oxalate (LEXAPRO) 20 mg tablet; Take 1 Tab by mouth daily.  -     TSH AND FREE T4    3. Eczema, unspecified type  -     Start triamcinolone acetonide (KENALOG) 0.025 % ointment; Apply  to affected area two (2) times a day. use thin layer           Medication risks/benefits/costs/interactions/alternatives discussed with patient. Advised patient to call back or return to office if symptoms worsen/change/persist. If patient cannot reach us or should anything more severe/urgent arise he/she should proceed directly to the nearest emergency department. Discussed expected course/resolution/complications of diagnosis in detail with patient. Patient given a written after visit summary which includes her diagnoses, current medications and vitals. Patient expressed understanding with the diagnosis and plan. Follow-up Disposition:  Return in about 2 weeks (around 5/30/2018).

## 2018-05-16 NOTE — MR AVS SNAPSHOT
[address] [place] [address]. Ray Young [address] [address] 
[phone] Patient: Sebastien Nesbitt MRN: [id number] :1995 Visit Information Date & Time Provider Department Dept. Phone Encounter #  
 2018  3:30 PM Carlos Puente MD [facility] Internal Medicine [phone] [id number] Follow-up Instructions Return in about 2 weeks (around 2018). Upcoming Health Maintenance Date Due DTaP/Tdap/Td series (6 - Td) 2016 PAP AKA CERVICAL CYTOLOGY 2016 Influenza Age 5 to Adult 2018 Allergies as of 2018  Review Complete On: 2018 By: Molly Quach MD  
  
 Severity Noted Reaction Type Reactions Nitrous Oxide High 2013    Anaphylaxis HAD TO BE BAGGED IN DENTIST OFFICE PER MOTHER Other Medication High 2013    Anaphylaxis Nitrous Oxide Septocaine [Articaine-epinephrine] High 2013    Anaphylaxis HAD TO BE BAGGED IN DENTIST OFFICE PER MOTHER Current Immunizations  Reviewed on 2013 Name Date DTAP Vaccine 1996, 1996, 1996, 1995, 1995 HIB Vaccine 10/4/1996, 1996, 1995, 1995 HPV 2010, 2009 Hepatitis B Vaccine 3/25/1996, 1995, 1995 Human Papillomavirus 2009 IPV 2000, 1996, 1995, 1995 MMR Vaccine 2000, 10/4/1996 Meningococcal Vaccine 2008 PPD 1/10/2013  4:01 PM  
 TDAP Vaccine 2006 Varicella Virus Vaccine 1/10/2013  4:01 PM  
 Varicella Virus Vaccine Live 1996 Not reviewed this visit You Were Diagnosed With   
  
 Codes Comments Anorexia    -  Primary ICD-10-CM: R63.0 ICD-9-CM: 783.0 Anxiety     ICD-10-CM: F41.9 ICD-9-CM: 300.00 Eczema, unspecified type     ICD-10-CM: L30.9 ICD-9-CM: 692.9 Vitals BP Pulse Temp Resp Height(growth percentile) Weight(growth percentile) 136/79 (BP 1 Location: Left arm, BP Patient Position: Sitting) 76 98.6 °F (37 °C) (Temporal) 18 5' 4.5\" (1.638 m) 107 lb 9.6 oz (48.8 kg) LMP SpO2 BMI OB Status Smoking Status 04/27/2018 100% 18.18 kg/m2 Having regular periods Never Smoker BMI and BSA Data Body Mass Index Body Surface Area  
 18.18 kg/m 2 1.49 m 2 Preferred Pharmacy Pharmacy Name Phone [id number] Ross, [address] [facility] [phone] Your Updated Medication List  
  
   
This list is accurate as of 5/16/18  3:47 PM.  Always use your most recent med list.  
  
  
  
  
 escitalopram oxalate 20 mg tablet Commonly known as:  Vallorie Primmer Take 1 Tab by mouth daily. traZODone 50 mg tablet Commonly known as:  Wagner Bloodgood Take 1 Tab by mouth nightly. triamcinolone acetonide 0.025 % ointment Commonly known as:  KENALOG Apply  to affected area two (2) times a day. use thin layer Prescriptions Sent to Pharmacy Refills  
 escitalopram oxalate (LEXAPRO) 20 mg tablet 2 Sig: Take 1 Tab by mouth daily. Class: Normal  
 Pharmacy: [address] Ph #: [phone] Route: Oral  
 triamcinolone acetonide (KENALOG) 0.025 % ointment 0 Sig: Apply  to affected area two (2) times a day. use thin layer Class: Normal  
 Pharmacy: [address] Ph #: [phone] Route: Topical  
  
We Performed the Following CBC WITH AUTOMATED DIFF [34221 CPT(R)] MAGNESIUM [id number] CPT(R)] METABOLIC PANEL, COMPREHENSIVE [93318 CPT(R)] PHOSPHORUS [41118 CPT(R)] TSH AND FREE T4 [01862 CPT(R)] Follow-up Instructions Return in about 2 weeks (around 5/30/2018). Introducing [facility]! Lisa Brewster introduces [facility] patient portal. Now you can access parts of your medical record, email your doctor's office, and request medication refills online. 1. In your internet browser, go to [email or web address] 2. Click on the First Time User? Click Here link in the Sign In box. You will see the New Member Sign Up page. 3. Enter your CloudFl[employer] Access Code exactly as it appears below. You will not need to use this code after youve completed the sign-up process. If you do not sign up before the expiration date, you must request a new code. · CloudFlare Access Code: [email or web address] Expires: 5/23/2018  3:57 PM 
 
4. Enter the last four digits of your Social Security Number (xxxx) and Date of Birth (mm/dd/yyyy) as indicated and click Submit. You will be taken to the next sign-up page. 5. Create a BLOVESt ID. This will be your CloudFlare login ID and cannot be changed, so think of one that is secure and easy to remember. 6. Create a CloudFlare password. You can change your password at any time. 7. Enter your Password Reset Question and Answer. This can be used at a later time if you forget your password. 8. Enter your e-mail address. You will receive e-mail notification when new information is available in [address] 9. Click Sign Up. You can now view and download portions of your medical record. 10. Click the Download Summary menu link to download a portable copy of your medical information. If you have questions, please visit the Frequently Asked Questions section of the CloudFlare website. Remember, CloudFlare is NOT to be used for urgent needs. For medical emergencies, dial 911. Now available from your iPhone and Android! Please provide this summary of care documentation to your next provider. Your primary care clinician is listed as Carlos Sainz. If you have any questions after today's visit, please call [phone].

## 2018-05-16 NOTE — LETTER
5/18/2018 12:24 PM 
 
Ms. Chuck Hillman [address] [place] [id number] Dear Chuck Hillman: Please find your most recent results below. Labs are normal.  
 
Resulted Orders TSH AND FREE T4 Result Value Ref Range TSH 1.050 0.450 - 4.500 uIU/mL T4, Free 1.16 0.82 - 1.77 ng/dL Narrative Performed at:  [address]  [id number] : Alexus Cobos MD, Phone:  [phone] METABOLIC PANEL, COMPREHENSIVE Result Value Ref Range Glucose 110 (H) 65 - 99 mg/dL BUN 13 6 - 20 mg/dL Creatinine 0.66 0.57 - 1.00 mg/dL GFR est non- >59 mL/min/1.73 GFR est  >59 mL/min/1.73  
 BUN/Creatinine ratio 20 9 - 23 Sodium 140 134 - 144 mmol/L Potassium 4.3 3.5 - 5.2 mmol/L Chloride 97 96 - 106 mmol/L  
 CO2 27 18 - 29 mmol/L Calcium 10.2 8.7 - 10.2 mg/dL Protein, total 7.4 6.0 - 8.5 g/dL Albumin 4.9 3.5 - 5.5 g/dL GLOBULIN, TOTAL 2.5 1.5 - 4.5 g/dL A-G Ratio 2.0 1.2 - 2.2 Bilirubin, total 0.5 0.0 - 1.2 mg/dL Alk. phosphatase 55 39 - 117 IU/L  
 AST (SGOT) 23 0 - 40 IU/L  
 ALT (SGPT) 22 0 - 32 IU/L Narrative Performed at:  [address]  [id number] : Alexus Cobos MD, Phone:  [phone] MAGNESIUM Result Value Ref Range Magnesium 2.0 1.6 - 2.3 mg/dL Narrative Performed at:  [address]  [id number] : Alexus Cobos MD, Phone:  [phone] PHOSPHORUS Result Value Ref Range Phosphorus 2.9 2.5 - 4.5 mg/dL Narrative Performed at:  [address]  [id number] : Alexus Cobos MD, Phone:  [phone] CBC WITH AUTOMATED DIFF Result Value Ref Range WBC 8.5 3.4 - 10.8 x10E3/uL  
 RBC 4.01 3.77 - 5.28 x10E6/uL HGB 12.1 11.1 - 15.9 g/dL HCT 37.2 34.0 - 46.6 %  MCV 93 79 - 97 fL  
 MCH 30.2 26.6 - 33.0 pg  
 MCHC 32.5 31.5 - 35.7 g/dL  
 RDW 13.3 12.3 - 15.4 % PLATELET 559 427 - 584 x10E3/uL NEUTROPHILS 72 Not Estab. % Lymphocytes 22 Not Estab. % MONOCYTES 4 Not Estab. % EOSINOPHILS 1 Not Estab. % BASOPHILS 1 Not Estab. %  
 ABS. NEUTROPHILS 6.2 1.4 - 7.0 x10E3/uL Abs Lymphocytes 1.9 0.7 - 3.1 x10E3/uL  
 ABS. MONOCYTES 0.3 0.1 - 0.9 x10E3/uL  
 ABS. EOSINOPHILS 0.1 0.0 - 0.4 x10E3/uL  
 ABS. BASOPHILS 0.1 0.0 - 0.2 x10E3/uL IMMATURE GRANULOCYTES 0 Not Estab. %  
 ABS. IMM. GRANS. 0.0 0.0 - 0.1 x10E3/uL Narrative Performed at:  [address]  [id number] : Dc Hollingsworth MD, Phone:  [phone] RECOMMENDATIONS: 
 
Keep up the good work! Please call me if you have any questions: [phone] Sincerely, 
 
 
Margaret Johnston MD

## 2018-05-17 LAB
ALBUMIN SERPL-MCNC: 4.9 G/DL (ref 3.5–5.5)
ALBUMIN/GLOB SERPL: 2 {RATIO} (ref 1.2–2.2)
ALP SERPL-CCNC: 55 IU/L (ref 39–117)
ALT SERPL-CCNC: 22 IU/L (ref 0–32)
AST SERPL-CCNC: 23 IU/L (ref 0–40)
BASOPHILS # BLD AUTO: 0.1 X10E3/UL (ref 0–0.2)
BASOPHILS NFR BLD AUTO: 1 %
BILIRUB SERPL-MCNC: 0.5 MG/DL (ref 0–1.2)
BUN SERPL-MCNC: 13 MG/DL (ref 6–20)
BUN/CREAT SERPL: 20 (ref 9–23)
CALCIUM SERPL-MCNC: 10.2 MG/DL (ref 8.7–10.2)
CHLORIDE SERPL-SCNC: 97 MMOL/L (ref 96–106)
CO2 SERPL-SCNC: 27 MMOL/L (ref 18–29)
CREAT SERPL-MCNC: 0.66 MG/DL (ref 0.57–1)
EOSINOPHIL # BLD AUTO: 0.1 X10E3/UL (ref 0–0.4)
EOSINOPHIL NFR BLD AUTO: 1 %
ERYTHROCYTE [DISTWIDTH] IN BLOOD BY AUTOMATED COUNT: 13.3 % (ref 12.3–15.4)
GFR SERPLBLD CREATININE-BSD FMLA CKD-EPI: 126 ML/MIN/1.73
GFR SERPLBLD CREATININE-BSD FMLA CKD-EPI: 145 ML/MIN/1.73
GLOBULIN SER CALC-MCNC: 2.5 G/DL (ref 1.5–4.5)
GLUCOSE SERPL-MCNC: 110 MG/DL (ref 65–99)
HCT VFR BLD AUTO: 37.2 % (ref 34–46.6)
HGB BLD-MCNC: 12.1 G/DL (ref 11.1–15.9)
IMM GRANULOCYTES # BLD: 0 X10E3/UL (ref 0–0.1)
IMM GRANULOCYTES NFR BLD: 0 %
LYMPHOCYTES # BLD AUTO: 1.9 X10E3/UL (ref 0.7–3.1)
LYMPHOCYTES NFR BLD AUTO: 22 %
MAGNESIUM SERPL-MCNC: 2 MG/DL (ref 1.6–2.3)
MCH RBC QN AUTO: 30.2 PG (ref 26.6–33)
MCHC RBC AUTO-ENTMCNC: 32.5 G/DL (ref 31.5–35.7)
MCV RBC AUTO: 93 FL (ref 79–97)
MONOCYTES # BLD AUTO: 0.3 X10E3/UL (ref 0.1–0.9)
MONOCYTES NFR BLD AUTO: 4 %
NEUTROPHILS # BLD AUTO: 6.2 X10E3/UL (ref 1.4–7)
NEUTROPHILS NFR BLD AUTO: 72 %
PHOSPHATE SERPL-MCNC: 2.9 MG/DL (ref 2.5–4.5)
PLATELET # BLD AUTO: 328 X10E3/UL (ref 150–379)
POTASSIUM SERPL-SCNC: 4.3 MMOL/L (ref 3.5–5.2)
PROT SERPL-MCNC: 7.4 G/DL (ref 6–8.5)
RBC # BLD AUTO: 4.01 X10E6/UL (ref 3.77–5.28)
SODIUM SERPL-SCNC: 140 MMOL/L (ref 134–144)
T4 FREE SERPL-MCNC: 1.16 NG/DL (ref 0.82–1.77)
TSH SERPL DL<=0.005 MIU/L-ACNC: 1.05 UIU/ML (ref 0.45–4.5)
WBC # BLD AUTO: 8.5 X10E3/UL (ref 3.4–10.8)

## 2018-05-21 ENCOUNTER — TELEPHONE (OUTPATIENT)
Dept: INTERNAL MEDICINE CLINIC | Age: 23
End: 2018-05-21

## 2018-05-30 ENCOUNTER — TELEPHONE (OUTPATIENT)
Dept: INTERNAL MEDICINE CLINIC | Age: 23
End: 2018-05-30

## 2018-05-30 ENCOUNTER — OFFICE VISIT (OUTPATIENT)
Dept: INTERNAL MEDICINE CLINIC | Age: 23
End: 2018-05-30

## 2018-05-30 VITALS
WEIGHT: 111 LBS | DIASTOLIC BLOOD PRESSURE: 57 MMHG | BODY MASS INDEX: 18.49 KG/M2 | RESPIRATION RATE: 18 BRPM | HEIGHT: 65 IN | TEMPERATURE: 98 F | OXYGEN SATURATION: 96 % | SYSTOLIC BLOOD PRESSURE: 114 MMHG | HEART RATE: 72 BPM

## 2018-05-30 DIAGNOSIS — F41.9 ANXIETY: ICD-10-CM

## 2018-05-30 DIAGNOSIS — R63.0 ANOREXIA: Primary | ICD-10-CM

## 2018-05-30 DIAGNOSIS — Z02.89 ENCOUNTER FOR COMPLETION OF FORM WITH PATIENT: ICD-10-CM

## 2018-05-30 NOTE — LETTER
5/31/2018 9:59 AM 
 
Ms. Danilo Brooks [address] [id number] Dear Danilo Brooks: Please find your most recent results below. CBC, kidney, liver, phosphorus, magnesium level is normal 
 
Resulted Orders CBC WITH AUTOMATED DIFF Result Value Ref Range WBC 7.0 3.4 - 10.8 x10E3/uL  
 RBC 3.94 3.77 - 5.28 x10E6/uL HGB 11.9 11.1 - 15.9 g/dL HCT 35.6 34.0 - 46.6 % MCV 90 79 - 97 fL  
 MCH 30.2 26.6 - 33.0 pg  
 MCHC 33.4 31.5 - 35.7 g/dL  
 RDW 13.3 12.3 - 15.4 % PLATELET 042 559 - 625 x10E3/uL NEUTROPHILS 70 Not Estab. % Lymphocytes 20 Not Estab. % MONOCYTES 6 Not Estab. % EOSINOPHILS 3 Not Estab. % BASOPHILS 1 Not Estab. %  
 ABS. NEUTROPHILS 4.9 1.4 - 7.0 x10E3/uL Abs Lymphocytes 1.4 0.7 - 3.1 x10E3/uL  
 ABS. MONOCYTES 0.4 0.1 - 0.9 x10E3/uL  
 ABS. EOSINOPHILS 0.2 0.0 - 0.4 x10E3/uL  
 ABS. BASOPHILS 0.1 0.0 - 0.2 x10E3/uL IMMATURE GRANULOCYTES 0 Not Estab. %  
 ABS. IMM. GRANS. 0.0 0.0 - 0.1 x10E3/uL Narrative Performed at:  [address]  [id number] : Anastacio Dudley MD, Phone:  [phone] MAGNESIUM Result Value Ref Range Magnesium 1.8 1.6 - 2.3 mg/dL Narrative Performed at:  [address]  [id number] : Anastacio Dudley MD, Phone:  [phone] PHOSPHORUS Result Value Ref Range Phosphorus 3.6 2.5 - 4.5 mg/dL Narrative Performed at:  [address]  [id number] : Anastacio Dudley MD, Phone:  [phone] METABOLIC PANEL, COMPREHENSIVE Result Value Ref Range Glucose 95 65 - 99 mg/dL BUN 17 6 - 20 mg/dL Creatinine 0.81 0.57 - 1.00 mg/dL GFR est non- >59 mL/min/1.73 GFR est  >59 mL/min/1.73  
 BUN/Creatinine ratio 21 9 - 23 Sodium 141 134 - 144 mmol/L Potassium 4.2 3.5 - 5.2 mmol/L  Chloride 98 96 - 106 mmol/L  
 CO2 29 18 - 29 mmol/L Comment: **Effective June 11, 2018 Carbon Dioxide, Total** 
  reference interval will be changing to: Age                  Male          Female 
     0 days   - 30 days         16 - 34        16 - 34 
    31 days   -  1 year         15 - 22        15 - 25 
     2 years  -  5 years        16 - 34        14 - 32 
     6 years  - 15 years        23 - 32        22 - 32 
               >12 years        21 - 32        18 - 34 Calcium 9.5 8.7 - 10.2 mg/dL Protein, total 6.7 6.0 - 8.5 g/dL Albumin 4.5 3.5 - 5.5 g/dL GLOBULIN, TOTAL 2.2 1.5 - 4.5 g/dL A-G Ratio 2.0 1.2 - 2.2 Bilirubin, total 0.4 0.0 - 1.2 mg/dL Alk. phosphatase 49 39 - 117 IU/L  
 AST (SGOT) 21 0 - 40 IU/L  
 ALT (SGPT) 18 0 - 32 IU/L Narrative Performed at:  [address]  [id number] : Lesly Moore MD, Phone:  [phone] RECOMMENDATIONS: 
 
Keep up the good work! Please call me if you have any questions: [phone] Sincerely, 
 
 
Jamison Galvan MD

## 2018-05-30 NOTE — TELEPHONE ENCOUNTER
Spoke with pt's nutritionist, Lois Álvarez. Informed her of pt's weight today and she will return in two weeks. She verbalized her understanding. States that she will see pt tomorrow and follow up with  after visit.

## 2018-05-30 NOTE — PROGRESS NOTES
Subjective:     Chief Complaint   Patient presents with    Anorexia     2wk f/u    Other     need FMLA papers filled out        She  is a 25y.o. year old female currently being treated for anorexia who presents today for two weeks follow up. Patient reports that her anxiety is little better with increasing the lexapro to 20 mg. Has been going to her mom's house three times/day for meal so that she can get supervised  who lives 3 minutes away from her house . She is also here to get FMLA paper filled out. States that she has started taking off from work from last week and would like to take off total of 8 weeks until she recover from her current condition. Denies any chest pain, soa, cough, palpitations. Pertinent items are noted in HPI. Objective:     Vitals:    05/30/18 1529   BP: 114/57   Pulse: 72   Resp: 18   Temp: 98 °F (36.7 °C)   TempSrc: Temporal   SpO2: 96%   Weight: 111 lb (50.3 kg)   Height: 5' 4.5\" (1.638 m)       Physical Examination: General appearance - alert, well appearing, and in no distress, oriented to person, place, and time and melissa appearance but better.    Mental status - alert, oriented to person, place, and time, normal mood, behavior, speech, dress, motor activity, and thought processes  Chest - clear to auscultation, no wheezes, rales or rhonchi, symmetric air entry  Heart - normal rate, regular rhythm, normal S1, S2, no murmurs, rubs, clicks or gallops    Allergies   Allergen Reactions    Nitrous Oxide Anaphylaxis     HAD TO BE BAGGED IN DENTIST OFFICE PER MOTHER    Other Medication Anaphylaxis     Nitrous Oxide      Septocaine [Articaine-Epinephrine] Anaphylaxis     HAD TO BE BAGGED IN DENTIST OFFICE PER MOTHER      Social History     Social History    Marital status: SINGLE     Spouse name: N/A    Number of children: N/A    Years of education: N/A     Social History Main Topics    Smoking status: Never Smoker    Smokeless tobacco: Never Used    Alcohol use No  Drug use: Yes     Special: Marijuana    Sexual activity: Yes     Partners: Male     Other Topics Concern    None     Social History Narrative    ** Merged History Encounter **           Family History   Problem Relation Age of Onset    No Known Problems Father     Stroke Maternal Grandmother     Cancer Maternal Grandmother      Lung    Heart Disease Maternal Grandfather     Hypertension Maternal Grandfather     Cancer Maternal Grandfather      Brain    Hypertension Paternal Grandmother     Other Paternal Grandmother      ALLERGY TO NOVACAINE    Heart Disease Paternal Grandfather     Other Paternal Grandfather      DIALYSIS      Past Surgical History:   Procedure Laterality Date    HX HEENT      ORAL SURGERY-LOCAL WITH NITROUS OXIDE    HX OTHER SURGICAL      Wart removed from L arm. Past Medical History:   Diagnosis Date    Anxiety 3/27/2014    Depression 3/27/2014    Depression, major, single episode, mild ([facility] [address] 3/27/2014    Dysmenorrhea 2/26/2016    Eczema     History of seasonal allergies     Insomnia 3/27/2014      Current Outpatient Prescriptions   Medication Sig Dispense Refill    escitalopram oxalate (LEXAPRO) 20 mg tablet Take 1 Tab by mouth daily. 30 Tab 2    triamcinolone acetonide (KENALOG) 0.025 % ointment Apply  to affected area two (2) times a day. use thin layer 30 g 0    traZODone (DESYREL) 50 mg tablet Take 1 Tab by mouth nightly. 30 Tab 2        Assessment/ Plan:   Diagnoses and all orders for this visit:    1. Anorexia  Wt Readings from Last 3 Encounters:   05/30/18 111 lb (50.3 kg)   05/16/18 107 lb 9.6 oz (48.8 kg)   05/02/18 108 lb 6.4 oz (49.2 kg)     Gained almost 4 pounds . Nice improvement. Encouraged to continue the current plan. -  CBC WITH AUTOMATED DIFF  -     MAGNESIUM  -     PHOSPHORUS  -     METABOLIC PANEL, COMPREHENSIVE        - continue follow up with nutritionist and psychotherapist.   2. Anxiety       - stable with Lexapro  3.  Encounter for completion of form with patient      - form filled out and handed to patient. Medication risks/benefits/costs/interactions/alternatives discussed with patient. Advised patient to call back or return to office if symptoms worsen/change/persist. If patient cannot reach us or should anything more severe/urgent arise he/she should proceed directly to the nearest emergency department. Discussed expected course/resolution/complications of diagnosis in detail with patient. Patient given a written after visit summary which includes her diagnoses, current medications and vitals. Patient expressed understanding with the diagnosis and plan. Follow-up Disposition:  Return in about 2 weeks (around 6/13/2018).

## 2018-05-30 NOTE — MR AVS SNAPSHOT
[address] [place] [address]. Ray Young [address] [address] 
[phone] Patient: Dick Allen MRN: [id number] :1995 Visit Information Date & Time Provider Department Dept. Phone Encounter #  
 2018  3:30 PM Carlos Perez MD [facility] [phone] [id number] Follow-up Instructions Return in about 2 weeks (around 2018). Upcoming Health Maintenance Date Due DTaP/Tdap/Td series (6 - Td) 2016 PAP AKA CERVICAL CYTOLOGY 2016 Influenza Age 5 to Adult 2018 Allergies as of 2018  Review Complete On: 2018 By: Damon Griffin MD  
  
 Severity Noted Reaction Type Reactions Nitrous Oxide High 2013    Anaphylaxis HAD TO BE BAGGED IN DENTIST OFFICE PER MOTHER Other Medication High 2013    Anaphylaxis Nitrous Oxide Septocaine [Articaine-epinephrine] High 2013    Anaphylaxis HAD TO BE BAGGED IN DENTIST OFFICE PER MOTHER Current Immunizations  Reviewed on 2013 Name Date DTAP Vaccine 1996, 1996, 1996, 1995, 1995 HIB Vaccine 10/4/1996, 1996, 1995, 1995 HPV 2010, 2009 Hepatitis B Vaccine 3/25/1996, 1995, 1995 Human Papillomavirus 2009 IPV 2000, 1996, 1995, 1995 MMR Vaccine 2000, 10/4/1996 Meningococcal Vaccine 2008 PPD 1/10/2013  4:01 PM  
 TDAP Vaccine 2006 Varicella Virus Vaccine 1/10/2013  4:01 PM  
 Varicella Virus Vaccine Live 1996 Not reviewed this visit You Were Diagnosed With   
  
 Codes Comments Anorexia    -  Primary ICD-10-CM: R63.0 ICD-9-CM: 783.0 Anxiety     ICD-10-CM: F41.9 ICD-9-CM: 300.00 Encounter for completion of form with patient     ICD-10-CM: Z02.89 ICD-9-CM: V68.89 Vitals BP Pulse Temp Resp Height(growth percentile) Weight(growth percentile) 114/57 (BP 1 Location: Left arm, BP Patient Position: Sitting) 72 98 °F (36.7 °C) (Temporal) 18 5' 4.5\" (1.638 m) 111 lb (50.3 kg) LMP SpO2 BMI OB Status Smoking Status 05/25/2018 96% 18.76 kg/m2 Having regular periods Never Smoker Vitals History BMI and BSA Data Body Mass Index Body Surface Area 18.76 kg/m 2 1.51 m 2 Preferred Pharmacy Pharmacy Name Phone [id number], [address] [facility] [phone] Your Updated Medication List  
  
   
This list is accurate as of 5/30/18  3:49 PM.  Always use your most recent med list.  
  
  
  
  
 escitalopram oxalate 20 mg tablet Commonly known as:  [place] Take 1 Tab by mouth daily. traZODone 50 mg tablet Commonly known as:  [place] Take 1 Tab by mouth nightly. triamcinolone acetonide 0.025 % ointment Commonly known as:  KENALOG Apply  to affected area two (2) times a day. use thin layer We Performed the Following CBC WITH AUTOMATED DIFF [51042 CPT(R)] MAGNESIUM [id number] CPT(R)] METABOLIC PANEL, COMPREHENSIVE [39920 CPT(R)] PHOSPHORUS [18264 CPT(R)] Follow-up Instructions Return in about 2 weeks (around 6/13/2018). Introducing [facility]! [facility] introduces [employer] patient portal. Now you can access parts of your medical record, email your doctor's office, and request medication refills online. 1. In your internet browser, go to [email or web address] 2. Click on the First Time User? Click Here link in the Sign In box. You will see the New Member Sign Up page. 3. Enter your [employer] Access Code exactly as it appears below. You will not need to use this code after youve completed the sign-up process. If you do not sign up before the expiration date, you must request a new code. · [employer] Access Code: [email or web address] Expires: 8/18/2018  5:26 AM 
 
 4. Enter the last four digits of your Social Security Number (xxxx) and Date of Birth (mm/dd/yyyy) as indicated and click Submit. You will be taken to the next sign-up page. 5. Create a [employer] ID. This will be your [employer] login ID and cannot be changed, so think of one that is secure and easy to remember. 6. Create a [employer] password. You can change your password at any time. 7. Enter your Password Reset Question and Answer. This can be used at a later time if you forget your password. 8. Enter your e-mail address. You will receive e-mail notification when new information is available in [address] 9. Click Sign Up. You can now view and download portions of your medical record. 10. Click the Download Summary menu link to download a portable copy of your medical information. If you have questions, please visit the Frequently Asked Questions section of the [employer] website. Remember, [employer] is NOT to be used for urgent needs. For medical emergencies, dial 911. Now available from your iPhone and Android! Please provide this summary of care documentation to your next provider. Your primary care clinician is listed as Carlos Sainz. If you have any questions after today's visit, please call [phone].

## 2018-05-31 LAB
ALBUMIN SERPL-MCNC: 4.5 G/DL (ref 3.5–5.5)
ALBUMIN/GLOB SERPL: 2 {RATIO} (ref 1.2–2.2)
ALP SERPL-CCNC: 49 IU/L (ref 39–117)
ALT SERPL-CCNC: 18 IU/L (ref 0–32)
AST SERPL-CCNC: 21 IU/L (ref 0–40)
BASOPHILS # BLD AUTO: 0.1 X10E3/UL (ref 0–0.2)
BASOPHILS NFR BLD AUTO: 1 %
BILIRUB SERPL-MCNC: 0.4 MG/DL (ref 0–1.2)
BUN SERPL-MCNC: 17 MG/DL (ref 6–20)
BUN/CREAT SERPL: 21 (ref 9–23)
CALCIUM SERPL-MCNC: 9.5 MG/DL (ref 8.7–10.2)
CHLORIDE SERPL-SCNC: 98 MMOL/L (ref 96–106)
CO2 SERPL-SCNC: 29 MMOL/L (ref 18–29)
CREAT SERPL-MCNC: 0.81 MG/DL (ref 0.57–1)
EOSINOPHIL # BLD AUTO: 0.2 X10E3/UL (ref 0–0.4)
EOSINOPHIL NFR BLD AUTO: 3 %
ERYTHROCYTE [DISTWIDTH] IN BLOOD BY AUTOMATED COUNT: 13.3 % (ref 12.3–15.4)
GFR SERPLBLD CREATININE-BSD FMLA CKD-EPI: 103 ML/MIN/1.73
GFR SERPLBLD CREATININE-BSD FMLA CKD-EPI: 119 ML/MIN/1.73
GLOBULIN SER CALC-MCNC: 2.2 G/DL (ref 1.5–4.5)
GLUCOSE SERPL-MCNC: 95 MG/DL (ref 65–99)
HCT VFR BLD AUTO: 35.6 % (ref 34–46.6)
HGB BLD-MCNC: 11.9 G/DL (ref 11.1–15.9)
IMM GRANULOCYTES # BLD: 0 X10E3/UL (ref 0–0.1)
IMM GRANULOCYTES NFR BLD: 0 %
LYMPHOCYTES # BLD AUTO: 1.4 X10E3/UL (ref 0.7–3.1)
LYMPHOCYTES NFR BLD AUTO: 20 %
MAGNESIUM SERPL-MCNC: 1.8 MG/DL (ref 1.6–2.3)
MCH RBC QN AUTO: 30.2 PG (ref 26.6–33)
MCHC RBC AUTO-ENTMCNC: 33.4 G/DL (ref 31.5–35.7)
MCV RBC AUTO: 90 FL (ref 79–97)
MONOCYTES # BLD AUTO: 0.4 X10E3/UL (ref 0.1–0.9)
MONOCYTES NFR BLD AUTO: 6 %
NEUTROPHILS # BLD AUTO: 4.9 X10E3/UL (ref 1.4–7)
NEUTROPHILS NFR BLD AUTO: 70 %
PHOSPHATE SERPL-MCNC: 3.6 MG/DL (ref 2.5–4.5)
PLATELET # BLD AUTO: 292 X10E3/UL (ref 150–379)
POTASSIUM SERPL-SCNC: 4.2 MMOL/L (ref 3.5–5.2)
PROT SERPL-MCNC: 6.7 G/DL (ref 6–8.5)
RBC # BLD AUTO: 3.94 X10E6/UL (ref 3.77–5.28)
SODIUM SERPL-SCNC: 141 MMOL/L (ref 134–144)
WBC # BLD AUTO: 7 X10E3/UL (ref 3.4–10.8)

## 2018-06-07 DIAGNOSIS — R63.0 ANOREXIA: ICD-10-CM

## 2018-06-07 DIAGNOSIS — G47.01 INSOMNIA DUE TO MEDICAL CONDITION: ICD-10-CM

## 2018-06-07 DIAGNOSIS — F41.9 ANXIETY: ICD-10-CM

## 2018-06-08 RX ORDER — TRAZODONE HYDROCHLORIDE 50 MG/1
TABLET ORAL
Qty: 30 TAB | Refills: 2 | Status: SHIPPED | OUTPATIENT
Start: 2018-06-08 | End: 2018-06-20 | Stop reason: SDUPTHER

## 2018-06-20 ENCOUNTER — OFFICE VISIT (OUTPATIENT)
Dept: INTERNAL MEDICINE CLINIC | Age: 23
End: 2018-06-20

## 2018-06-20 VITALS
DIASTOLIC BLOOD PRESSURE: 70 MMHG | OXYGEN SATURATION: 100 % | HEART RATE: 55 BPM | BODY MASS INDEX: 18.33 KG/M2 | TEMPERATURE: 97.9 F | HEIGHT: 65 IN | SYSTOLIC BLOOD PRESSURE: 113 MMHG | RESPIRATION RATE: 18 BRPM | WEIGHT: 110 LBS

## 2018-06-20 DIAGNOSIS — G47.01 INSOMNIA DUE TO MEDICAL CONDITION: ICD-10-CM

## 2018-06-20 DIAGNOSIS — F32.0 DEPRESSION, MAJOR, SINGLE EPISODE, MILD (HCC): ICD-10-CM

## 2018-06-20 DIAGNOSIS — R63.0 ANOREXIA: Primary | ICD-10-CM

## 2018-06-20 DIAGNOSIS — F41.9 ANXIETY: ICD-10-CM

## 2018-06-20 RX ORDER — TRAZODONE HYDROCHLORIDE 50 MG/1
TABLET ORAL
Qty: 45 TAB | Refills: 2 | Status: SHIPPED | OUTPATIENT
Start: 2018-06-20 | End: 2018-09-18 | Stop reason: SDUPTHER

## 2018-06-20 NOTE — PROGRESS NOTES
Subjective:     Chief Complaint   Patient presents with    Anorexia        She  is a 25y.o. year old female with h/o anorexia, depression, anxiety who presents today for follow up . She was last seen on 5/30/2018. She reports that she went for vacation in [facility], just came 4 days ago. She enjoyed her stay but she was anxious when time for meal.   She states that she did not miss any meal bu she was really anxious. Has been having nightmares lately since her friend's incident. Doesn't do any exercise, does not use nay laxative. BM is regular. Period is normal.         Pertinent items are noted in HPI. Objective:     Vitals:    06/20/18 1540   BP: 113/70   Pulse: (!) 55   Resp: 18   Temp: 97.9 °F (36.6 °C)   TempSrc: Temporal   SpO2: 100%   Weight: 110 lb (49.9 kg)   Height: 5' 4.5\" (1.638 m)       Physical Examination: General appearance - alert, well appearing, and in no distress, oriented to person, place, and time and thin appearance but healthy. Mental status - alert, oriented to person, place, and time, normal mood, behavior, speech, dress, motor activity, and thought processes  Neck - supple, no significant adenopathy  Chest - clear to auscultation, no wheezes, rales or rhonchi, symmetric air entry  Heart - normal rate, regular rhythm, normal S1, S2, no murmurs, rubs, clicks or gallops  Skin - dry skin noted . Advised for moisturizer.      Allergies   Allergen Reactions    Nitrous Oxide Anaphylaxis     HAD TO BE BAGGED IN DENTIST OFFICE PER MOTHER    Other Medication Anaphylaxis     Nitrous Oxide      Septocaine [Articaine-Epinephrine] Anaphylaxis     HAD TO BE BAGGED IN DENTIST OFFICE PER MOTHER      Social History     Social History    Marital status: SINGLE     Spouse name: N/A    Number of children: N/A    Years of education: N/A     Social History Main Topics    Smoking status: Never Smoker    Smokeless tobacco: Never Used    Alcohol use No    Drug use: Yes     Special: Marijuana    Sexual activity: Yes     Partners: Male     Other Topics Concern    None     Social History Narrative    ** Merged History Encounter **           Family History   Problem Relation Age of Onset    No Known Problems Father     Stroke Maternal Grandmother     Cancer Maternal Grandmother      Lung    Heart Disease Maternal Grandfather     Hypertension Maternal Grandfather     Cancer Maternal Grandfather      Brain    Hypertension Paternal Grandmother     Other Paternal Grandmother      ALLERGY TO NOVACAINE    Heart Disease Paternal Grandfather     Other Paternal Grandfather      DIALYSIS      Past Surgical History:   Procedure Laterality Date    HX HEENT      ORAL SURGERY-LOCAL WITH NITROUS OXIDE    HX OTHER SURGICAL      Wart removed from L arm. Past Medical History:   Diagnosis Date    Anxiety 3/27/2014    Depression 3/27/2014    Depression, major, single episode, mild ([address] 3/27/2014    Dysmenorrhea 2/26/2016    Eczema     History of seasonal allergies     Insomnia 3/27/2014      Current Outpatient Prescriptions   Medication Sig Dispense Refill    traZODone (DESYREL) 50 mg tablet TAKE 1.5  TAB BY MOUTH NIGHTLY. 45 Tab 2    escitalopram oxalate (LEXAPRO) 20 mg tablet Take 1 Tab by mouth daily. 30 Tab 2    triamcinolone acetonide (KENALOG) 0.025 % ointment Apply  to affected area two (2) times a day. use thin layer 30 g 0        Assessment/ Plan:   Diagnoses and all orders for this visit:    1. Anorexia  -    Lost one pound in 18 days. Continue follow meal plan offerred by nutritionist.        Continue follow up psychotherapist.   -     Increase traZODone (DESYREL) 50 mg tablet; TAKE 1.5  TAB BY MOUTH NIGHTLY. [facility]  -     CBC WITH AUTOMATED DIFF  -     METABOLIC PANEL, COMPREHENSIVE  -     PHOSPHORUS  -     MAGNESIUM  -     ZINC  -     VITAMIN D, 25 HYDROXY    2.  Depression, major, single episode, mild (HCC)  -    Increase  traZODone (DESYREL) 50 mg tablet; TAKE 1.5  TAB BY MOUTH NIGHTLY. [facility]    3. Anxiety  -     [facility]    4. Insomnia due to medical condition  -    Increase  traZODone (DESYREL) 50 mg tablet; TAKE 1.5  TAB BY MOUTH NIGHTLY. [facility] Psychiatry [facility]           Medication risks/benefits/costs/interactions/alternatives discussed with patient. Advised patient to call back or return to office if symptoms worsen/change/persist. If patient cannot reach us or should anything more severe/urgent arise he/she should proceed directly to the nearest emergency department. Discussed expected course/resolution/complications of diagnosis in detail with patient. Patient given a written after visit summary which includes her diagnoses, current medications and vitals. Patient expressed understanding with the diagnosis and plan. Follow-up Disposition:  Return in about 2 weeks (around 7/4/2018).

## 2018-06-20 NOTE — PATIENT INSTRUCTIONS
Anorexia: Care Instructions  Your Care Instructions    Anorexia is a type of eating disorder. People who have anorexia don't eat enough to stay at a normal weight. Sometimes they also exercise too much. They do these things because they're so afraid of gaining weight. They believe that they're fat, even when they're thin. Often they think that losing even more weight will solve their problems and make their lives better. If you have anorexia, it can really harm your health. This is because your body doesn't get the nutrition it needs. The first step to controlling the problem is admitting that something is wrong. Then counseling can help you change how you think about food, the way you see your body, and any other emotional issues. It may take months or years, but you can recover from anorexia. Follow-up care is a key part of your treatment and safety. Be sure to make and go to all appointments, and call your doctor if you are having problems. It's also a good idea to know your test results and keep a list of the medicines you take. How can you care for yourself at home? Follow your treatment plan  · Go to your counseling sessions. Call or talk with your counselor if you can't go or if you think the sessions aren't helping. Don't just stop going. · Take your medicines exactly as prescribed. Call your doctor if you think you are having a problem with your medicine. You will get more details on the specific medicines your doctor prescribes. Trust people who are helping you  · Listen to what counselors and nutrition experts say about healthy eating. · Learn about what is included in a balanced diet. Then discuss what you learn. · Let people know how you are feeling. Listen to how they are feeling too. · Accept support and feedback from other people. Learn to be easier on yourself  · Learn to focus on your good qualities. · If your body feels weak, slow down and do less.   · Remind yourself that feeling bad about yourself is all part of your disorder. · Remember that it takes time to recover from anorexia. · Spend time with other people doing things you enjoy. · Try to focus on one goal at a time. · Don't blame yourself for your disorder. Develop healthy eating habits  · With your doctor and counselor, you will decide on a good weight for you. You will also decide how much weight you will gain each week. · Try not to argue with the people you eat with. Arguing increases stress. And stress can make make it harder for you to relax and eat. · Talk with other people during meals about things that interest you. Don't talk about food or gaining weight. Caring for a loved one with anorexia  · Remind yourself that anorexia is a long-lasting disorder. It takes time for changes to occur. · Show love and support for your loved one, especially if he or she gets discouraged. · Support your loved one as he or she goes through the steps of recovery. Focus on wellness. Don't focus on food. · Take care of yourself. Continue with your own interests, career, hobbies, and friends. · Don't blame yourself or look for the reason for the disorder. · If you are having a hard time, talk with a counselor. · Learn what to do if your loved one relapses. When should you call for help? Call 911 anytime you think you may need emergency care. For example, call if:  · A person with anorexia seems depressed and is talking about suicide. If the talk about suicide seems real, stay with the person, or ask someone you trust to stay with the person, until you get emergency help. · You have a rapid or irregular heartbeat. · You passed out (lost consciousness). Call your doctor now or seek immediate medical care if:  · You feel hopeless or have thoughts of hurting yourself. Watch closely for changes in your health, and be sure to contact your doctor if:  · You have trouble sleeping. · You feel anxious or depressed.   Where can you learn more?  Go to http://annalise-laila.info/. Enter R146 in the search box to learn more about \"Anorexia: Care Instructions. \"  Current as of: May 12, 2017  Content Version: 11.4  © 1154-2790 [employer]wise, [employer]. Care instructions adapted under license by [employer] (which disclaims liability or warranty for this information). If you have questions about a medical condition or this instruction, always ask your healthcare professional. [address] any warranty or liability for your use of this information.

## 2018-06-20 NOTE — MR AVS SNAPSHOT
[address] [place] [address]. Moosekikirstin Alvareza [address] [address] 
[phone] Patient: Mauricio Lanier MRN: [id number] :1995 Visit Information Date & Time Provider Department Dept. Phone Encounter #  
 2018  3:30 PM Charles Montoya MD [facility] Internal Medicine [phone] [id number] Follow-up Instructions Return in about 2 weeks (around 2018). Upcoming Health Maintenance Date Due DTaP/Tdap/Td series (6 - Td) 2016 PAP AKA CERVICAL CYTOLOGY 2016 Influenza Age 5 to Adult 2018 Allergies as of 2018  Review Complete On: 2018 By: Charles Montoya MD  
  
 Severity Noted Reaction Type Reactions Nitrous Oxide High 2013    Anaphylaxis HAD TO BE BAGGED IN DENTIST OFFICE PER MOTHER Other Medication High 2013    Anaphylaxis Nitrous Oxide Septocaine [Articaine-epinephrine] High 2013    Anaphylaxis HAD TO BE BAGGED IN DENTIST OFFICE PER MOTHER Current Immunizations  Reviewed on 2013 Name Date DTAP Vaccine 1996, 1996, 1996, 1995, 1995 HIB Vaccine 10/4/1996, 1996, 1995, 1995 HPV 2010, 2009 Hepatitis B Vaccine 3/25/1996, 1995, 1995 Human Papillomavirus 2009 IPV 2000, 1996, 1995, 1995 MMR Vaccine 2000, 10/4/1996 Meningococcal Vaccine 2008 PPD 1/10/2013  4:01 PM  
 TDAP Vaccine 2006 Varicella Virus Vaccine 1/10/2013  4:01 PM  
 Varicella Virus Vaccine Live 1996 Not reviewed this visit You Were Diagnosed With   
  
 Codes Comments Anorexia    -  Primary ICD-10-CM: R63.0 ICD-9-CM: 783.0 Depression, major, single episode, mild (HCC)     ICD-10-CM: F32.0 ICD-9-CM: 296.21 Anxiety     ICD-10-CM: F41.9 ICD-9-CM: 300.00 Insomnia due to medical condition     ICD-10-CM: G47.01 
ICD-9-CM: 327.01 Vitals BP Pulse Temp Resp Height(growth percentile) Weight(growth percentile) 113/70 (BP 1 Location: Left arm, BP Patient Position: Sitting) (!) 55 97.9 °F (36.6 °C) (Temporal) 18 5' 4.5\" (1.638 m) 110 lb (49.9 kg) LMP SpO2 BMI OB Status Smoking Status 06/19/2018 100% 18.59 kg/m2 Having regular periods Never Smoker Vitals History BMI and BSA Data Body Mass Index Body Surface Area 18.59 kg/m 2 1.51 m 2 Preferred Pharmacy Pharmacy Name Phone [id number] [place], [address] [facility] [phone] Your Updated Medication List  
  
   
This list is accurate as of 6/20/18  3:55 PM.  Always use your most recent med list.  
  
  
  
  
 escitalopram oxalate 20 mg tablet Commonly known as:  Claudia Grey Take 1 Tab by mouth daily. traZODone 50 mg tablet Commonly known as:  DESYREL  
TAKE 1.5  TAB BY MOUTH NIGHTLY.  
  
 triamcinolone acetonide 0.025 % ointment Commonly known as:  KENALOG Apply  to affected area two (2) times a day. use thin layer Prescriptions Sent to Pharmacy Refills  
 traZODone (DESYREL) 50 mg tablet 2 Sig: TAKE 1.5  TAB BY MOUTH NIGHTLY. Class: Normal  
 Pharmacy: [address] [place] [address] [address] [facility] #: [phone] We Performed the Following CBC WITH AUTOMATED DIFF [26772 CPT(R)] MAGNESIUM [id number] CPT(R)] METABOLIC PANEL, COMPREHENSIVE [83491 CPT(R)] PHOSPHORUS [80703 CPT(R)] REFERRAL TO PSYCHIATRY [REF91 Custom] ZINC [id number] CPT(R)] Follow-up Instructions Return in about 2 weeks (around 7/4/2018). Referral Information Referral ID Referred By Referred To  
  
 [id number] ALEXANDRA MAHAJAN [address] [address], [address] Phone: [phone] Fax: [phone] Visits Status Start Date End Date 1 New Request 6/20/18 6/20/19 If your referral has a status of pending review or denied, additional information will be sent to support the outcome of this decision. Patient Instructions Anorexia: Care Instructions Your Care Instructions Anorexia is a type of eating disorder. People who have anorexia don't eat enough to stay at a normal weight. Sometimes they also exercise too much. They do these things because they're so afraid of gaining weight. They believe that they're fat, even when they're thin. Often they think that losing even more weight will solve their problems and make their lives better. If you have anorexia, it can really harm your health. This is because your body doesn't get the nutrition it needs. The first step to controlling the problem is admitting that something is wrong. Then counseling can help you change how you think about food, the way you see your body, and any other emotional issues. It may take months or years, but you can recover from anorexia. Follow-up care is a key part of your treatment and safety. Be sure to make and go to all appointments, and call your doctor if you are having problems. It's also a good idea to know your test results and keep a list of the medicines you take. How can you care for yourself at home? Follow your treatment plan · Go to your counseling sessions. Call or talk with your counselor if you can't go or if you think the sessions aren't helping. Don't just stop going. · Take your medicines exactly as prescribed. Call your doctor if you think you are having a problem with your medicine. You will get more details on the specific medicines your doctor prescribes. Trust people who are helping you · Listen to what counselors and nutrition experts say about healthy eating. · Learn about what is included in a balanced diet. Then discuss what you learn. · Let people know how you are feeling. Listen to how they are feeling too. · Accept support and feedback from other people. Learn to be easier on yourself · Learn to focus on your good qualities. · If your body feels weak, slow down and do less. · Remind yourself that feeling bad about yourself is all part of your disorder. · Remember that it takes time to recover from anorexia. · Spend time with other people doing things you enjoy. · Try to focus on one goal at a time. · Don't blame yourself for your disorder. Develop healthy eating habits · With your doctor and counselor, you will decide on a good weight for you. You will also decide how much weight you will gain each week. · Try not to argue with the people you eat with. Arguing increases stress. And stress can make make it harder for you to relax and eat. · Talk with other people during meals about things that interest you. Don't talk about food or gaining weight. Caring for a loved one with anorexia · Remind yourself that anorexia is a long-lasting disorder. It takes time for changes to occur. · Show love and support for your loved one, especially if he or she gets discouraged. · Support your loved one as he or she goes through the steps of recovery. Focus on wellness. Don't focus on food. · Take care of yourself. Continue with your own interests, career, hobbies, and friends. · Don't blame yourself or look for the reason for the disorder. · If you are having a hard time, talk with a counselor. · Learn what to do if your loved one relapses. When should you call for help? Call 911 anytime you think you may need emergency care. For example, call if: · A person with anorexia seems depressed and is talking about suicide. If the talk about suicide seems real, stay with the person, or ask someone you trust to stay with the person, until you get emergency help. · You have a rapid or irregular heartbeat. · You passed out (lost consciousness). Call your doctor now or seek immediate medical care if: · You feel hopeless or have thoughts of hurting yourself. Watch closely for changes in your health, and be sure to contact your doctor if: 
· You have trouble sleeping. · You feel anxious or depressed. Where can you learn more? Go to http://annalise-laila.info/. Enter A415 in the search box to learn more about \"Anorexia: Care Instructions. \" Current as of: May 12, 2017 Content Version: 11.4 © 0721-1784 [employer]. Care instructions adapted under license by [employer] (which disclaims liability or warranty for this information). If you have questions about a medical condition or this instruction, always ask your healthcare professional. [address] any warranty or liability for your use of this information. Introducing [facility] HEALTH SERVICES! Guilherme Saunders introduces mig33 patient portal. Now you can access parts of your medical record, email your doctor's office, and request medication refills online. 1. In your internet browser, go to [email or web address] 2. Click on the First Time User? Click Here link in the Sign In box. You will see the New Member Sign Up page. 3. Enter your [email or web address] Access Code exactly as it appears below. You will not need to use this code after youve completed the sign-up process. If you do not sign up before the expiration date, you must request a new code. · [email or web address] Access Code: [email or web address] Expires: 8/18/2018  5:26 AM 
 
4. Enter the last four digits of your Social Security Number (xxxx) and Date of Birth (mm/dd/yyyy) as indicated and click Submit. You will be taken to the next sign-up page. 5. Create a mig33 ID. This will be your mig33 login ID and cannot be changed, so think of one that is secure and easy to remember. 6. Create a mig33 password. You can change your password at any time. 7. Enter your Password Reset Question and Answer.  This can be used at a later time if you forget your password. 8. Enter your e-mail address. You will receive e-mail notification when new information is available in [address] 9. Click Sign Up. You can now view and download portions of your medical record. 10. Click the Download Summary menu link to download a portable copy of your medical information. If you have questions, please visit the Frequently Asked Questions section of the [employer] website. Remember, [employer] is NOT to be used for urgent needs. For medical emergencies, dial 911. Now available from your iPhone and Android! Please provide this summary of care documentation to your next provider. Your primary care clinician is listed as Carlos Sainz. If you have any questions after today's visit, please call [phone].

## 2018-06-22 LAB
ALBUMIN SERPL-MCNC: 4.6 G/DL (ref 3.5–5.5)
ALBUMIN/GLOB SERPL: 1.8 {RATIO} (ref 1.2–2.2)
ALP SERPL-CCNC: 52 IU/L (ref 39–117)
ALT SERPL-CCNC: 21 IU/L (ref 0–32)
AST SERPL-CCNC: 26 IU/L (ref 0–40)
BASOPHILS # BLD AUTO: 0.1 X10E3/UL (ref 0–0.2)
BASOPHILS NFR BLD AUTO: 1 %
BILIRUB SERPL-MCNC: 0.5 MG/DL (ref 0–1.2)
BUN SERPL-MCNC: 13 MG/DL (ref 6–20)
BUN/CREAT SERPL: 20 (ref 9–23)
CALCIUM SERPL-MCNC: 9.9 MG/DL (ref 8.7–10.2)
CHLORIDE SERPL-SCNC: 101 MMOL/L (ref 96–106)
CO2 SERPL-SCNC: 23 MMOL/L (ref 20–29)
CREAT SERPL-MCNC: 0.66 MG/DL (ref 0.57–1)
EOSINOPHIL # BLD AUTO: 0.2 X10E3/UL (ref 0–0.4)
EOSINOPHIL NFR BLD AUTO: 3 %
ERYTHROCYTE [DISTWIDTH] IN BLOOD BY AUTOMATED COUNT: 13.4 % (ref 12.3–15.4)
GFR SERPLBLD CREATININE-BSD FMLA CKD-EPI: 126 ML/MIN/1.73
GFR SERPLBLD CREATININE-BSD FMLA CKD-EPI: 145 ML/MIN/1.73
GLOBULIN SER CALC-MCNC: 2.5 G/DL (ref 1.5–4.5)
GLUCOSE SERPL-MCNC: 81 MG/DL (ref 65–99)
HCT VFR BLD AUTO: 40 % (ref 34–46.6)
HGB BLD-MCNC: 12.9 G/DL (ref 11.1–15.9)
IMM GRANULOCYTES # BLD: 0 X10E3/UL (ref 0–0.1)
IMM GRANULOCYTES NFR BLD: 0 %
LYMPHOCYTES # BLD AUTO: 2.1 X10E3/UL (ref 0.7–3.1)
LYMPHOCYTES NFR BLD AUTO: 24 %
MAGNESIUM SERPL-MCNC: 2 MG/DL (ref 1.6–2.3)
MCH RBC QN AUTO: 30.3 PG (ref 26.6–33)
MCHC RBC AUTO-ENTMCNC: 32.3 G/DL (ref 31.5–35.7)
MCV RBC AUTO: 94 FL (ref 79–97)
MONOCYTES # BLD AUTO: 0.7 X10E3/UL (ref 0.1–0.9)
MONOCYTES NFR BLD AUTO: 8 %
NEUTROPHILS # BLD AUTO: 5.6 X10E3/UL (ref 1.4–7)
NEUTROPHILS NFR BLD AUTO: 64 %
PHOSPHATE SERPL-MCNC: 3.3 MG/DL (ref 2.5–4.5)
PLATELET # BLD AUTO: 295 X10E3/UL (ref 150–379)
POTASSIUM SERPL-SCNC: 4.2 MMOL/L (ref 3.5–5.2)
PROT SERPL-MCNC: 7.1 G/DL (ref 6–8.5)
RBC # BLD AUTO: 4.26 X10E6/UL (ref 3.77–5.28)
SODIUM SERPL-SCNC: 140 MMOL/L (ref 134–144)
WBC # BLD AUTO: 8.7 X10E3/UL (ref 3.4–10.8)
ZINC SERPL-MCNC: NORMAL UG/DL

## 2018-07-05 ENCOUNTER — OFFICE VISIT (OUTPATIENT)
Dept: INTERNAL MEDICINE CLINIC | Age: 23
End: 2018-07-05

## 2018-07-05 VITALS
TEMPERATURE: 97.9 F | RESPIRATION RATE: 14 BRPM | OXYGEN SATURATION: 100 % | DIASTOLIC BLOOD PRESSURE: 63 MMHG | HEIGHT: 65 IN | BODY MASS INDEX: 18.99 KG/M2 | SYSTOLIC BLOOD PRESSURE: 112 MMHG | HEART RATE: 58 BPM | WEIGHT: 114 LBS

## 2018-07-05 DIAGNOSIS — G47.01 INSOMNIA DUE TO MEDICAL CONDITION: ICD-10-CM

## 2018-07-05 DIAGNOSIS — R63.0 ANOREXIA: Primary | ICD-10-CM

## 2018-07-05 NOTE — MR AVS SNAPSHOT
[address] [place] [address]. Ray Alvareza [address] [address] 
[phone] Patient: Magdaleno Londono MRN: [id number] :1995 Visit Information Date & Time Provider Department Dept. Phone Encounter #  
 2018  3:45 PM Samy Sal MD [facility] Internal Medicine [phone] [id number] Follow-up Instructions Return in about 2 weeks (around 2018). Upcoming Health Maintenance Date Due DTaP/Tdap/Td series (6 - Td) 2016 PAP AKA CERVICAL CYTOLOGY 2016 Influenza Age 5 to Adult 2018 Allergies as of 2018  Review Complete On: 2018 By: Samy Sal MD  
  
 Severity Noted Reaction Type Reactions Nitrous Oxide High 2013    Anaphylaxis HAD TO BE BAGGED IN DENTIST OFFICE PER MOTHER Other Medication High 2013    Anaphylaxis Nitrous Oxide Septocaine [Articaine-epinephrine] High 2013    Anaphylaxis HAD TO BE BAGGED IN DENTIST OFFICE PER MOTHER Current Immunizations  Reviewed on 2013 Name Date DTAP Vaccine 1996, 1996, 1996, 1995, 1995 HIB Vaccine 10/4/1996, 1996, 1995, 1995 HPV 2010, 2009 Hepatitis B Vaccine 3/25/1996, 1995, 1995 Human Papillomavirus 2009 IPV 2000, 1996, 1995, 1995 MMR Vaccine 2000, 10/4/1996 Meningococcal Vaccine 2008 PPD 1/10/2013  4:01 PM  
 TDAP Vaccine 2006 Varicella Virus Vaccine 1/10/2013  4:01 PM  
 Varicella Virus Vaccine Live 1996 Not reviewed this visit You Were Diagnosed With   
  
 Codes Comments Anorexia    -  Primary ICD-10-CM: R63.0 ICD-9-CM: 783.0 Insomnia due to medical condition     ICD-10-CM: G47.01 
ICD-9-CM: 327.01 Vitals BP Pulse Temp Resp Height(growth percentile) Weight(growth percentile)  112/63 (BP 1 Location: Left arm, BP Patient Position: Sitting) (!) 58 97.9 °F (36.6 °C) (Tympanic) 14 5' 4.5\" (1.638 m) 114 lb (51.7 kg) LMP SpO2 BMI OB Status Smoking Status 06/19/2018 100% 19.27 kg/m2 Having regular periods Never Smoker BMI and BSA Data Body Mass Index Body Surface Area  
 19.27 kg/m 2 1.53 m 2 Preferred Pharmacy Pharmacy Name Phone [id number], [address] [facility] [place] [phone] Your Updated Medication List  
  
   
This list is accurate as of 7/5/18  4:24 PM.  Always use your most recent med list.  
  
  
  
  
 escitalopram oxalate 20 mg tablet Commonly known as:  Mike Legacy Take 1 Tab by mouth daily. traZODone 50 mg tablet Commonly known as:  DESYREL  
TAKE 1.5  TAB BY MOUTH NIGHTLY.  
  
 triamcinolone acetonide 0.025 % ointment Commonly known as:  KENALOG Apply  to affected area two (2) times a day. use thin layer Follow-up Instructions Return in about 2 weeks (around 7/19/2018). Introducing [facility] & HEALTH [facility]! Dear Bonilla Severe: Thank you for requesting a [employer] account. Our records indicate that you already have an active [employer] account. You can access your account anytime at [email or web address] Did you know that you can access your hospital and ER discharge instructions at any time in [employer]? You can also review all of your test results from your hospital stay or ER visit. Additional Information If you have questions, please visit the Frequently Asked Questions section of the [employer] website at [email or web address] Remember, [employer] is NOT to be used for urgent needs. For medical emergencies, dial 911. Now available from your iPhone and Android! Please provide this summary of care documentation to your next provider. Your primary care clinician is listed as Carlos Sainz. If you have any questions after today's visit, please call [phone].

## 2018-07-05 NOTE — PROGRESS NOTES
1. Have you been to the ER, urgent care clinic since your last visit? Hospitalized since your last visit? No    2. Have you seen or consulted any other health care providers outside of the [address] [place] [address] since your last visit? Include any pap smears or colon screening.  No     Chief Complaint   Patient presents with    Weight Management     Visit Vitals    /63 (BP 1 Location: Left arm, BP Patient Position: Sitting)    Pulse (!) 58    Temp 97.9 °F (36.6 °C) (Tympanic)    Resp 14    Ht 5' 4.5\" (1.638 m)    Wt 114 lb (51.7 kg)    SpO2 100%    BMI 19.27 kg/m2

## 2018-07-06 NOTE — PROGRESS NOTES
Subjective:     Chief Complaint   Patient presents with    Weight Management        She  is a 21y.o. year old female with h/o anorexia , anxiety, depression, who presents today for follow up. She has gained 4 pounds since last visit. States that she feels great. Has been sleeping better with trazodone 1.5 tab . Anxiety, depression has been stable and better controlled with lexapro 20 mg. Wt Readings from Last 3 Encounters:   07/05/18 114 lb (51.7 kg)   06/20/18 110 lb (49.9 kg)   05/30/18 111 lb (50.3 kg)     Denies any constipation, chest pain, soa  Has been following up with psychotherapist and nutritionist.     Pertinent items are noted in HPI. Objective:     Vitals:    07/05/18 1546   BP: 112/63   Pulse: (!) 58   Resp: 14   Temp: 97.9 °F (36.6 °C)   TempSrc: Tympanic   SpO2: 100%   Weight: 114 lb (51.7 kg)   Height: 5' 4.5\" (1.638 m)       Physical Examination: General appearance - alert, well appearing, and in no distress, oriented to person, place, and time and melissa appearance.   Mental status - alert, oriented to person, place, and time, normal mood, behavior, speech, dress, motor activity, and thought processes  Chest - clear to auscultation, no wheezes, rales or rhonchi, symmetric air entry  Heart - normal rate, regular rhythm, normal S1, S2, no murmurs, rubs, clicks or gallops    Allergies   Allergen Reactions    Nitrous Oxide Anaphylaxis     HAD TO BE BAGGED IN DENTIST OFFICE PER MOTHER    Other Medication Anaphylaxis     Nitrous Oxide      Septocaine [Articaine-Epinephrine] Anaphylaxis     HAD TO BE BAGGED IN DENTIST OFFICE PER MOTHER      Social History     Social History    Marital status: SINGLE     Spouse name: N/A    Number of children: N/A    Years of education: N/A     Social History Main Topics    Smoking status: Never Smoker    Smokeless tobacco: Never Used    Alcohol use No    Drug use: Yes     Special: Marijuana    Sexual activity: Yes     Partners: Male     Other Topics Concern    None     Social History Narrative    ** Merged History Encounter **           Family History   Problem Relation Age of Onset    No Known Problems Father     Stroke Maternal Grandmother     Cancer Maternal Grandmother      Lung    Heart Disease Maternal Grandfather     Hypertension Maternal Grandfather     Cancer Maternal Grandfather      Brain    Hypertension Paternal Grandmother     Other Paternal Grandmother      ALLERGY TO NOVACAINE    Heart Disease Paternal Grandfather     Other Paternal Grandfather      DIALYSIS      Past Surgical History:   Procedure Laterality Date    HX HEENT      ORAL SURGERY-LOCAL WITH NITROUS OXIDE    HX OTHER SURGICAL      Wart removed from L arm. Past Medical History:   Diagnosis Date    Anxiety 3/27/2014    Depression 3/27/2014    Depression, major, single episode, mild ([facility] [address] 3/27/2014    Dysmenorrhea 2/26/2016    Eczema     History of seasonal allergies     Insomnia 3/27/2014      Current Outpatient Prescriptions   Medication Sig Dispense Refill    traZODone (DESYREL) 50 mg tablet TAKE 1.5  TAB BY MOUTH NIGHTLY. 45 Tab 2    escitalopram oxalate (LEXAPRO) 20 mg tablet Take 1 Tab by mouth daily. 30 Tab 2    triamcinolone acetonide (KENALOG) 0.025 % ointment Apply  to affected area two (2) times a day. use thin layer 30 g 0        Assessment/ Plan:   Diagnoses and all orders for this visit:    1. Anorexia  Gained four pounds since last visit. Continue follow meal plan offerred by nutritionist.        Continue follow up psychotherapist  2. Insomnia due to medical condition      Well controlled with trazodone. Medication risks/benefits/costs/interactions/alternatives discussed with patient. Advised patient to call back or return to office if symptoms worsen/change/persist. If patient cannot reach us or should anything more severe/urgent arise he/she should proceed directly to the nearest emergency department.   Discussed expected course/resolution/complications of diagnosis in detail with patient. Patient given a written after visit summary which includes her diagnoses, current medications and vitals. Patient expressed understanding with the diagnosis and plan. Follow-up Disposition:  Return in about 2 weeks (around 7/19/2018).

## 2018-07-14 LAB
25(OH)D3+25(OH)D2 SERPL-MCNC: 44 NG/ML (ref 30–100)
SPECIMEN STATUS REPORT, ROLRST: NORMAL

## 2018-07-18 ENCOUNTER — OFFICE VISIT (OUTPATIENT)
Dept: INTERNAL MEDICINE CLINIC | Age: 23
End: 2018-07-18

## 2018-07-18 VITALS
SYSTOLIC BLOOD PRESSURE: 110 MMHG | DIASTOLIC BLOOD PRESSURE: 74 MMHG | HEART RATE: 65 BPM | HEIGHT: 65 IN | OXYGEN SATURATION: 100 % | RESPIRATION RATE: 17 BRPM | BODY MASS INDEX: 19.19 KG/M2 | WEIGHT: 115.2 LBS | TEMPERATURE: 97.6 F

## 2018-07-18 DIAGNOSIS — R63.0 ANOREXIA: Primary | ICD-10-CM

## 2018-07-18 NOTE — PROGRESS NOTES
Subjective:     Chief Complaint   Patient presents with    Anorexia     2 week f/u        She  is a 21y.o. year old female old female with h/o anorexia , anxiety, depression, who presents today for follow up. She has gained 1 pound since last visit. Reports that she has feeling good. She found a new job. States that she feels great. Has been sleeping better with trazodone 1.5 tab . Anxiety, depression has been stable and better controlled with lexapro 20 mg. Denies any constipation, abdominal pain, chest pain,    Wt Readings from Last 3 Encounters:   07/18/18 115 lb 3.2 oz (52.3 kg)   07/05/18 114 lb (51.7 kg)   06/20/18 110 lb (49.9 kg)         Pertinent items are noted in HPI.   Objective:     Vitals:    07/18/18 1504   BP: 110/74   Pulse: 65   Resp: 17   Temp: 97.6 °F (36.4 °C)   TempSrc: Temporal   SpO2: 100%   Weight: 115 lb 3.2 oz (52.3 kg)   Height: 5' 4.5\" (1.638 m)       Physical Examination: General appearance - alert, well appearing, and in no distress, oriented to person, place, and time and normal appearing weight  Mental status - alert, oriented to person, place, and time, normal mood, behavior, speech, dress, motor activity, and thought processes  Chest - clear to auscultation, no wheezes, rales or rhonchi, symmetric air entry  Heart - normal rate, regular rhythm, normal S1, S2, no murmurs, rubs, clicks or gallops    Allergies   Allergen Reactions    Nitrous Oxide Anaphylaxis     HAD TO BE BAGGED IN DENTIST OFFICE PER MOTHER    Other Medication Anaphylaxis     Nitrous Oxide      Septocaine [Articaine-Epinephrine] Anaphylaxis     HAD TO BE BAGGED IN DENTIST OFFICE PER MOTHER      Social History     Social History    Marital status: SINGLE     Spouse name: N/A    Number of children: N/A    Years of education: N/A     Social History Main Topics    Smoking status: Never Smoker    Smokeless tobacco: Never Used    Alcohol use No    Drug use: Yes     Special: Marijuana    Sexual activity: Yes     Partners: Male     Other Topics Concern    None     Social History Narrative    ** Merged History Encounter **           Family History   Problem Relation Age of Onset    No Known Problems Father     Stroke Maternal Grandmother     Cancer Maternal Grandmother      Lung    Heart Disease Maternal Grandfather     Hypertension Maternal Grandfather     Cancer Maternal Grandfather      Brain    Hypertension Paternal Grandmother     Other Paternal Grandmother      ALLERGY TO NOVACAINE    Heart Disease Paternal Grandfather     Other Paternal Grandfather      DIALYSIS      Past Surgical History:   Procedure Laterality Date    HX HEENT      ORAL SURGERY-LOCAL WITH NITROUS OXIDE    HX OTHER SURGICAL      Wart removed from L arm. Past Medical History:   Diagnosis Date    Anxiety 3/27/2014    Depression 3/27/2014    Depression, major, single episode, mild ([facility] [address] 3/27/2014    Dysmenorrhea 2/26/2016    Eczema     History of seasonal allergies     Insomnia 3/27/2014      Current Outpatient Prescriptions   Medication Sig Dispense Refill    traZODone (DESYREL) 50 mg tablet TAKE 1.5  TAB BY MOUTH NIGHTLY. 45 Tab 2    escitalopram oxalate (LEXAPRO) 20 mg tablet Take 1 Tab by mouth daily. 30 Tab 2    triamcinolone acetonide (KENALOG) 0.025 % ointment Apply  to affected area two (2) times a day. use thin layer 30 g 0        Assessment/ Plan:   Diagnoses and all orders for this visit:    1. Anorexia  -     METABOLIC PANEL, COMPREHENSIVE  -     PHOSPHORUS  -     MAGNESIUM  -     CBC WITH AUTOMATED DIFF     Gained 1 pound since last visit. Continue follow meal plan offerred by nutritionist.        Continue follow up psychotherapist      Medication risks/benefits/costs/interactions/alternatives discussed with patient.   Advised patient to call back or return to office if symptoms worsen/change/persist. If patient cannot reach us or should anything more severe/urgent arise he/she should proceed directly to the nearest emergency department. Discussed expected course/resolution/complications of diagnosis in detail with patient. Patient given a written after visit summary which includes her diagnoses, current medications and vitals. Patient expressed understanding with the diagnosis and plan. Follow-up Disposition:  Return in about 3 weeks (around 8/8/2018).

## 2018-07-18 NOTE — MR AVS SNAPSHOT
[address] [place] [address]. Ray Hector [address] [place] [address] 
[phone] Patient: Fabien Gaffney MRN: [id number] :1995 Visit Information Date & Time Provider Department Dept. Phone Encounter #  
 2018  3:00 PM Samy Sal MD [facility] Internal Medicine [phone] [id number] Follow-up Instructions Return in about 3 weeks (around 2018). Upcoming Health Maintenance Date Due DTaP/Tdap/Td series (6 - Td) 2016 PAP AKA CERVICAL CYTOLOGY 2016 Influenza Age 5 to Adult 2018 Allergies as of 2018  Review Complete On: 2018 By: Samy Sal MD  
  
 Severity Noted Reaction Type Reactions Nitrous Oxide High 2013    Anaphylaxis HAD TO BE BAGGED IN DENTIST OFFICE PER MOTHER Other Medication High 2013    Anaphylaxis Nitrous Oxide Septocaine [Articaine-epinephrine] High 2013    Anaphylaxis HAD TO BE BAGGED IN DENTIST OFFICE PER MOTHER Current Immunizations  Reviewed on 2013 Name Date DTAP Vaccine 1996, 1996, 1996, 1995, 1995 HIB Vaccine 10/4/1996, 1996, 1995, 1995 HPV 2010, 2009 Hepatitis B Vaccine 3/25/1996, 1995, 1995 Human Papillomavirus 2009 IPV 2000, 1996, 1995, 1995 MMR Vaccine 2000, 10/4/1996 Meningococcal Vaccine 2008 PPD 1/10/2013  4:01 PM  
 TDAP Vaccine 2006 Varicella Virus Vaccine 1/10/2013  4:01 PM  
 Varicella Virus Vaccine Live 1996 Not reviewed this visit You Were Diagnosed With   
  
 Codes Comments Anorexia    -  Primary ICD-10-CM: R63.0 ICD-9-CM: 319. 0 Vitals BP Pulse Temp Resp Height(growth percentile) Weight(growth percentile) 110/74 (BP 1 Location: Left arm, BP Patient Position: Sitting) 65 97.6 °F (36.4 °C) (Temporal) 17 5' 4.5\" (1.638 m) 115 lb 3.2 oz (52.3 kg) LMP SpO2 BMI OB Status Smoking Status 07/16/2018 100% 19.47 kg/m2 Having regular periods Never Smoker Vitals History BMI and BSA Data Body Mass Index Body Surface Area  
 19.47 kg/m 2 1.54 m 2 Preferred Pharmacy Pharmacy Name Phone [id number] [address] [phone] Your Updated Medication List  
  
   
This list is accurate as of 7/18/18  3:37 PM.  Always use your most recent med list.  
  
  
  
  
 escitalopram oxalate 20 mg tablet Commonly known as:  Arty [place] Take 1 Tab by mouth daily. traZODone 50 mg tablet Commonly known as:  DESYREL  
TAKE 1.5  TAB BY MOUTH NIGHTLY.  
  
 triamcinolone acetonide 0.025 % ointment Commonly known as:  KENALOG Apply  to affected area two (2) times a day. use thin layer We Performed the Following CBC WITH AUTOMATED DIFF [36604 CPT(R)] MAGNESIUM [id number] CPT(R)] METABOLIC PANEL, COMPREHENSIVE [02205 CPT(R)] PHOSPHORUS [11991 CPT(R)] Follow-up Instructions Return in about 3 weeks (around 8/8/2018). Introducing [facility] & HEALTH SERVICES! Dear Theora Severin: Thank you for requesting a Sidekick Games account. Our records indicate that you already have an active Sidekick Games account. You can access your account anytime at [email or web address] Did you know that you can access your hospital and ER discharge instructions at any time in Sidekick [employer]? You can also review all of your test results from your hospital stay or ER visit. Additional Information If you have questions, please visit the Frequently Asked Questions section of the Sidekick Games website at [email or web address] Remember, Sidekick Games is NOT to be used for urgent needs. For medical emergencies, dial 911. Now available from your iPhone and Android! Please provide this summary of care documentation to your next provider. Your primary care clinician is listed as Carlos Sainz. If you have any questions after today's visit, please call [phone].

## 2018-07-19 ENCOUNTER — TELEPHONE (OUTPATIENT)
Dept: INTERNAL MEDICINE CLINIC | Age: 23
End: 2018-07-19

## 2018-07-19 LAB
ALBUMIN SERPL-MCNC: 4.7 G/DL (ref 3.5–5.5)
ALBUMIN/GLOB SERPL: 2 {RATIO} (ref 1.2–2.2)
ALP SERPL-CCNC: 51 IU/L (ref 39–117)
ALT SERPL-CCNC: 11 IU/L (ref 0–32)
AST SERPL-CCNC: 22 IU/L (ref 0–40)
BASOPHILS # BLD AUTO: 0.1 X10E3/UL (ref 0–0.2)
BASOPHILS NFR BLD AUTO: 1 %
BILIRUB SERPL-MCNC: 0.5 MG/DL (ref 0–1.2)
BUN SERPL-MCNC: 15 MG/DL (ref 6–20)
BUN/CREAT SERPL: 19 (ref 9–23)
CALCIUM SERPL-MCNC: 10.1 MG/DL (ref 8.7–10.2)
CHLORIDE SERPL-SCNC: 100 MMOL/L (ref 96–106)
CO2 SERPL-SCNC: 25 MMOL/L (ref 20–29)
CREAT SERPL-MCNC: 0.77 MG/DL (ref 0.57–1)
EOSINOPHIL # BLD AUTO: 0.2 X10E3/UL (ref 0–0.4)
EOSINOPHIL NFR BLD AUTO: 2 %
ERYTHROCYTE [DISTWIDTH] IN BLOOD BY AUTOMATED COUNT: 13.3 % (ref 12.3–15.4)
GLOBULIN SER CALC-MCNC: 2.4 G/DL (ref 1.5–4.5)
GLUCOSE SERPL-MCNC: 85 MG/DL (ref 65–99)
HCT VFR BLD AUTO: 38.5 % (ref 34–46.6)
HGB BLD-MCNC: 12.5 G/DL (ref 11.1–15.9)
IMM GRANULOCYTES # BLD: 0 X10E3/UL (ref 0–0.1)
IMM GRANULOCYTES NFR BLD: 0 %
LYMPHOCYTES # BLD AUTO: 2 X10E3/UL (ref 0.7–3.1)
LYMPHOCYTES NFR BLD AUTO: 25 %
MAGNESIUM SERPL-MCNC: 2 MG/DL (ref 1.6–2.3)
MCH RBC QN AUTO: 30.3 PG (ref 26.6–33)
MCHC RBC AUTO-ENTMCNC: 32.5 G/DL (ref 31.5–35.7)
MCV RBC AUTO: 93 FL (ref 79–97)
MONOCYTES # BLD AUTO: 0.4 X10E3/UL (ref 0.1–0.9)
MONOCYTES NFR BLD AUTO: 5 %
NEUTROPHILS # BLD AUTO: 5.4 X10E3/UL (ref 1.4–7)
NEUTROPHILS NFR BLD AUTO: 67 %
PHOSPHATE SERPL-MCNC: 3.2 MG/DL (ref 2.5–4.5)
PLATELET # BLD AUTO: 292 X10E3/UL (ref 150–379)
POTASSIUM SERPL-SCNC: 4.6 MMOL/L (ref 3.5–5.2)
PROT SERPL-MCNC: 7.1 G/DL (ref 6–8.5)
RBC # BLD AUTO: 4.12 X10E6/UL (ref 3.77–5.28)
SODIUM SERPL-SCNC: 140 MMOL/L (ref 134–144)
WBC # BLD AUTO: 8 X10E3/UL (ref 3.4–10.8)

## 2018-08-09 ENCOUNTER — TELEPHONE (OUTPATIENT)
Dept: INTERNAL MEDICINE CLINIC | Age: 23
End: 2018-08-09

## 2018-08-09 NOTE — TELEPHONE ENCOUNTER
Dietician called and stated pt is still doing well with weight and meals, 119.1 lbs today, still very motivated for recovery and to let her know how her labs are doing.

## 2018-08-13 DIAGNOSIS — R63.0 ANOREXIA: ICD-10-CM

## 2018-08-13 DIAGNOSIS — F41.9 ANXIETY: ICD-10-CM

## 2018-08-13 RX ORDER — ESCITALOPRAM OXALATE 20 MG/1
TABLET ORAL
Qty: 30 TAB | Refills: 2 | Status: SHIPPED | OUTPATIENT
Start: 2018-08-13 | End: 2018-11-30 | Stop reason: SDUPTHER

## 2018-08-22 ENCOUNTER — OFFICE VISIT (OUTPATIENT)
Dept: INTERNAL MEDICINE CLINIC | Age: 23
End: 2018-08-22

## 2018-08-22 VITALS
WEIGHT: 116.8 LBS | RESPIRATION RATE: 17 BRPM | OXYGEN SATURATION: 100 % | SYSTOLIC BLOOD PRESSURE: 103 MMHG | TEMPERATURE: 97.6 F | HEIGHT: 65 IN | HEART RATE: 62 BPM | DIASTOLIC BLOOD PRESSURE: 56 MMHG | BODY MASS INDEX: 19.46 KG/M2

## 2018-08-22 DIAGNOSIS — R63.0 ANOREXIA: Primary | ICD-10-CM

## 2018-08-22 DIAGNOSIS — F41.9 ANXIETY: ICD-10-CM

## 2018-08-22 DIAGNOSIS — G47.01 INSOMNIA DUE TO MEDICAL CONDITION: ICD-10-CM

## 2018-08-22 NOTE — PROGRESS NOTES
Subjective:     Chief Complaint   Patient presents with    Anorexia        She  is a 21y.o. year old female with h/o anorexia , anxiety, depression, who presents today for follow up. She has been slowly gaining weight. Has been following up with nutritionist and  Psychotherapist regularly. States that she feels great. Has been sleeping better with trazodone 1.5 tab . Anxiety, depression has been stable and better controlled with lexapro 20 mg. Denies any chest pain, palpitation, constipation. Pertinent items are noted in HPI.   Objective:     Vitals:    08/22/18 1528   BP: 103/56   Pulse: 62   Resp: 17   Temp: 97.6 °F (36.4 °C)   TempSrc: Temporal   SpO2: 100%   Weight: 116 lb 12.8 oz (53 kg)   Height: 5' 4.5\" (1.638 m)       Physical Examination: General appearance - alert, well appearing, and in no distress, oriented to person, place, and time and healthy looking  Mental status - alert, oriented to person, place, and time, normal mood, behavior, speech, dress, motor activity, and thought processes  Chest - clear to auscultation, no wheezes, rales or rhonchi, symmetric air entry  Heart - normal rate, regular rhythm, normal S1, S2, no murmurs, rubs, clicks or gallops    Allergies   Allergen Reactions    Nitrous Oxide Anaphylaxis     HAD TO BE BAGGED IN DENTIST OFFICE PER MOTHER    Other Medication Anaphylaxis     Nitrous Oxide      Septocaine [Articaine-Epinephrine] Anaphylaxis     HAD TO BE BAGGED IN DENTIST OFFICE PER MOTHER      Social History     Social History    Marital status: SINGLE     Spouse name: N/A    Number of children: N/A    Years of education: N/A     Social History Main Topics    Smoking status: Never Smoker    Smokeless tobacco: Never Used    Alcohol use No    Drug use: Yes     Special: Marijuana    Sexual activity: Yes     Partners: Male     Other Topics Concern    None     Social History Narrative    ** Merged History Encounter **           Family History   Problem Relation Age of Onset    No Known Problems Father     Stroke Maternal Grandmother     Cancer Maternal Grandmother      Lung    Heart Disease Maternal Grandfather     Hypertension Maternal Grandfather     Cancer Maternal Grandfather      Brain    Hypertension Paternal Grandmother     Other Paternal Grandmother      ALLERGY TO NOVACAINE    Heart Disease Paternal Grandfather     Other Paternal Grandfather      DIALYSIS      Past Surgical History:   Procedure Laterality Date    HX HEENT      ORAL SURGERY-LOCAL WITH NITROUS OXIDE    HX OTHER SURGICAL      Wart removed from L arm. Past Medical History:   Diagnosis Date    Anxiety 3/27/2014    Depression 3/27/2014    Depression, major, single episode, mild ([facility] [address] 3/27/2014    Dysmenorrhea 2/26/2016    Eczema     History of seasonal allergies     Insomnia 3/27/2014      Current Outpatient Prescriptions   Medication Sig Dispense Refill    escitalopram oxalate (LEXAPRO) 20 mg tablet TAKE 1 TABLET BY MOUTH EVERY DAY 30 Tab 2    traZODone (DESYREL) 50 mg tablet TAKE 1.5  TAB BY MOUTH NIGHTLY. 45 Tab 2    triamcinolone acetonide (KENALOG) 0.025 % ointment Apply  to affected area two (2) times a day. use thin layer 30 g 0        Assessment/ Plan:   Diagnoses and all orders for this visit:    1. Anorexia  Doing well. BMI is in better range. Continue follow meal plan offerred by nutritionist.        Continue follow up psychotherapist  2. Anxiety        Well controlled with Lexapro. 3. Insomnia due to medical condition       Well controlled with 75 mg trazodone. Medication risks/benefits/costs/interactions/alternatives discussed with patient. Advised patient to call back or return to office if symptoms worsen/change/persist. If patient cannot reach us or should anything more severe/urgent arise he/she should proceed directly to the nearest emergency department.   Discussed expected course/resolution/complications of diagnosis in detail with patient. Patient given a written after visit summary which includes her diagnoses, current medications and vitals. Patient expressed understanding with the diagnosis and plan. Follow-up Disposition:  Return in about 1 month (around 9/22/2018) for weight .

## 2018-08-22 NOTE — MR AVS SNAPSHOT
[address] [place] [address]. [address] Hector [address] [facility] 13 
[phone] Patient: Karson Mena MRN: [id number] :1995 Visit Information Date & Time Provider Department Dept. Phone Encounter #  
 2018  3:30 PM Hoda Barajas MD [facility] Internal Medicine [phone] [id number] Follow-up Instructions Return in about 1 month (around 2018) for weight . Upcoming Health Maintenance Date Due DTaP/Tdap/Td series (6 - Td) 2016 PAP AKA CERVICAL CYTOLOGY 2016 Influenza Age 5 to Adult 2018 Allergies as of 2018  Review Complete On: 2018 By: Hoda Barajas MD  
  
 Severity Noted Reaction Type Reactions Nitrous Oxide High 2013    Anaphylaxis HAD TO BE BAGGED IN DENTIST OFFICE PER MOTHER Other Medication High 2013    Anaphylaxis Nitrous Oxide Septocaine [Articaine-epinephrine] High 2013    Anaphylaxis HAD TO BE BAGGED IN DENTIST OFFICE PER MOTHER Current Immunizations  Reviewed on 2013 Name Date DTAP Vaccine 1996, 1996, 1996, 1995, 1995 HIB Vaccine 10/4/1996, 1996, 1995, 1995 HPV 2010, 2009 Hepatitis B Vaccine 3/25/1996, 1995, 1995 Human Papillomavirus 2009 IPV 2000, 1996, 1995, 1995 MMR Vaccine 2000, 10/4/1996 Meningococcal Vaccine 2008 PPD 1/10/2013  4:01 PM  
 TDAP Vaccine 2006 Varicella Virus Vaccine 1/10/2013  4:01 PM  
 Varicella Virus Vaccine Live 1996 Not reviewed this visit You Were Diagnosed With   
  
 Codes Comments Anorexia    -  Primary ICD-10-CM: R63.0 ICD-9-CM: 783.0 Anxiety     ICD-10-CM: F41.9 ICD-9-CM: 300.00 Insomnia due to medical condition     ICD-10-CM: G47.01 
ICD-9-CM: 327.01 Vitals BP Pulse Temp Resp Height(growth percentile) Weight(growth percentile) 103/56 (BP 1 Location: Left arm, BP Patient Position: Sitting) 62 97.6 °F (36.4 °C) (Temporal) 17 5' 4.5\" (1.638 m) 116 lb 12.8 oz (53 kg) LMP SpO2 BMI OB Status Smoking Status 08/14/2018 100% 19.74 kg/m2 Having regular periods Never Smoker Vitals History BMI and BSA Data Body Mass Index Body Surface Area 19.74 kg/m 2 1.55 m 2 Preferred Pharmacy Pharmacy Name Phone [id number], [address] [facility] [phone] Your Updated Medication List  
  
   
This list is accurate as of 8/22/18  3:44 PM.  Always use your most recent med list.  
  
  
  
  
 escitalopram oxalate 20 mg tablet Commonly known as:  Luis Miguel Pipp[place] TAKE 1 TABLET BY MOUTH EVERY DAY  
  
 traZODone 50 mg tablet Commonly known as:  DESYREL  
TAKE 1.5  TAB BY MOUTH NIGHTLY.  
  
 triamcinolone acetonide 0.025 % ointment Commonly known as:  KENALOG Apply  to affected area two (2) times a day. use thin layer Follow-up Instructions Return in about 1 month (around 9/22/2018) for weight . Introducing [facility] & HEALTH SERVICES! Dear Mariam Quintanilla: Thank you for requesting a Re2you account. Our records indicate that you already have an active Re2you account. You can access your account anytime at [email or web address]/[email or web address] Power Did you know that you can access your hospital and ER discharge instructions at any time in Re2you? You can also review all of your test results from your hospital stay or ER visit. Additional Information If you have questions, please visit the Frequently Asked Questions section of the Re2you website at [email or web address]/. Remember, Re2you is NOT to be used for urgent needs. For medical emergencies, dial 911. Now available from your iPhone and Android! Please provide this summary of care documentation to your next provider. Your primary care clinician is listed as Carlos Sainz. If you have any questions after today's visit, please call [phone].

## 2018-09-06 ENCOUNTER — TELEPHONE (OUTPATIENT)
Dept: INTERNAL MEDICINE CLINIC | Age: 23
End: 2018-09-06

## 2018-09-06 NOTE — TELEPHONE ENCOUNTER
Called and stated pt is doing well with eating and weight is 116.8, she is very very well  And in therapy. Not sure when she got blood work last but would like to see how that is.

## 2018-09-18 DIAGNOSIS — R63.0 ANOREXIA: ICD-10-CM

## 2018-09-18 DIAGNOSIS — G47.01 INSOMNIA DUE TO MEDICAL CONDITION: ICD-10-CM

## 2018-09-18 DIAGNOSIS — F32.0 DEPRESSION, MAJOR, SINGLE EPISODE, MILD (HCC): ICD-10-CM

## 2018-09-18 RX ORDER — TRAZODONE HYDROCHLORIDE 50 MG/1
TABLET ORAL
Qty: 45 TAB | Refills: 6 | OUTPATIENT
Start: 2018-09-18

## 2018-09-18 RX ORDER — TRAZODONE HYDROCHLORIDE 50 MG/1
TABLET ORAL
Qty: 45 TAB | Refills: 6 | Status: SHIPPED | OUTPATIENT
Start: 2018-09-18 | End: 2019-07-22 | Stop reason: SDUPTHER

## 2018-09-25 ENCOUNTER — OFFICE VISIT (OUTPATIENT)
Dept: PRIMARY CARE CLINIC | Age: 23
End: 2018-09-25

## 2018-09-25 VITALS
OXYGEN SATURATION: 100 % | DIASTOLIC BLOOD PRESSURE: 81 MMHG | TEMPERATURE: 98 F | SYSTOLIC BLOOD PRESSURE: 118 MMHG | HEART RATE: 69 BPM | WEIGHT: 117.8 LBS | BODY MASS INDEX: 19.63 KG/M2 | RESPIRATION RATE: 16 BRPM | HEIGHT: 65 IN

## 2018-09-25 DIAGNOSIS — L30.9 ECZEMA, UNSPECIFIED TYPE: ICD-10-CM

## 2018-09-25 DIAGNOSIS — R63.0 ANOREXIA: Primary | ICD-10-CM

## 2018-09-25 DIAGNOSIS — F41.9 ANXIETY: ICD-10-CM

## 2018-09-25 DIAGNOSIS — G47.01 INSOMNIA DUE TO MEDICAL CONDITION: ICD-10-CM

## 2018-09-25 RX ORDER — TRIAMCINOLONE ACETONIDE 0.25 MG/G
OINTMENT TOPICAL 2 TIMES DAILY
Qty: 30 G | Refills: 1 | Status: SHIPPED | OUTPATIENT
Start: 2018-09-25 | End: 2019-01-08 | Stop reason: ALTCHOICE

## 2018-09-25 NOTE — PROGRESS NOTES
Subjective:     Chief Complaint   Patient presents with    Anorexia     1 month f/u        She  is a 21y.o. year old female with h/o anorexia , anxiety, depression, who presents today for follow up. She has been doing well. Keeping her weight up persistently. States that she feels back to her normal state. 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 Has been following up with nutritionist and  Psychotherapist regularly. Wt Readings from Last 3 Encounters:   09/25/18 117 lb 12.8 oz (53.4 kg)   08/22/18 116 lb 12.8 oz (53 kg)   07/18/18 115 lb 3.2 oz (52.3 kg)          States that she feels great. Has been sleeping better with trazodone 1 tab currently. .  Anxiety, depression has been stable and better controlled with lexapro 20 mg.     Denies any chest pain, palpitation, constipation. Eczema well controlled with as needed triamcinolone. Need refill. Pertinent items are noted in HPI. Objective:     Vitals:    09/25/18 1207   BP: 118/81   Pulse: 69   Resp: 16   Temp: 98 °F (36.7 °C)   TempSrc: Oral   SpO2: 100%   Weight: 117 lb 12.8 oz (53.4 kg)   Height: 5' 4.5\" (1.638 m)       Physical Examination: General appearance - alert, well appearing, and in no distress, oriented to person, place, and time and normal appearing weight  Mental status - alert, oriented to person, place, and time, normal mood, behavior, speech, dress, motor activity, and thought processes  Chest - clear to auscultation, no wheezes, rales or rhonchi, symmetric air entry  Heart - normal rate, regular rhythm, normal S1, S2, no murmurs, rubs, clicks or gallops  Skin - normal coloration and turgor, no rashes, no suspicious skin lesions noted. Eczema well controlled.      Allergies   Allergen Reactions    Nitrous Oxide Anaphylaxis     HAD TO BE BAGGED IN DENTIST OFFICE PER MOTHER    Other Medication Anaphylaxis     Nitrous Oxide      Septocaine [Articaine-Epinephrine] Anaphylaxis     HAD TO BE BAGGED IN DENTIST OFFICE PER MOTHER      Social History     Social History    Marital status: SINGLE     Spouse name: N/A    Number of children: N/A    Years of education: N/A     Social History Main Topics    Smoking status: Never Smoker    Smokeless tobacco: Never Used    Alcohol use Yes      Comment: rarely    Drug use: Yes     Special: Marijuana    Sexual activity: Yes     Partners: Male     Other Topics Concern    None     Social History Narrative    ** Merged History Encounter **           Family History   Problem Relation Age of Onset    No Known Problems Father     Stroke Maternal Grandmother     Cancer Maternal Grandmother      Lung    Heart Disease Maternal Grandfather     Hypertension Maternal Grandfather     Cancer Maternal Grandfather      Brain    Hypertension Paternal Grandmother     Other Paternal Grandmother      ALLERGY TO NOVACAINE    Heart Disease Paternal Grandfather     Other Paternal Grandfather      DIALYSIS      Past Surgical History:   Procedure Laterality Date    HX HEENT      ORAL SURGERY-LOCAL WITH NITROUS OXIDE    HX OTHER SURGICAL      Wart removed from L arm. Past Medical History:   Diagnosis Date    Anxiety 3/27/2014    Depression 3/27/2014    Depression, major, single episode, mild ([facility] [address] 3/27/2014    Dysmenorrhea 2/26/2016    Eczema     History of seasonal allergies     Insomnia 3/27/2014      Current Outpatient Prescriptions   Medication Sig Dispense Refill    traZODone (DESYREL) 50 mg tablet TAKE 1.5  TAB BY MOUTH NIGHTLY. 45 Tab 6    escitalopram oxalate (LEXAPRO) 20 mg tablet TAKE 1 TABLET BY MOUTH EVERY DAY 30 Tab 2    triamcinolone acetonide (KENALOG) 0.025 % ointment Apply  to affected area two (2) times a day. use thin layer 30 g 0        Assessment/ Plan:   Diagnoses and all orders for this visit:    1. Anorexia  Doing well. BMI is in better range. Continue follow meal plan recommended by nutritionist.     Continue follow up psychotherapist  2. Eczema, unspecified type  -     triamcinolone acetonide (KENALOG) 0.025 % ointment; Apply  to affected area two (2) times a day. use thin layer    3. Insomnia due to medical condition        - well controlled with Trazodone 50 mg.   4. Anxiety       - Well managed by lexapro 20 mg. .          Medication risks/benefits/costs/interactions/alternatives discussed with patient. Advised patient to call back or return to office if symptoms worsen/change/persist. If patient cannot reach us or should anything more severe/urgent arise he/she should proceed directly to the nearest emergency department. Discussed expected course/resolution/complications of diagnosis in detail with patient. Patient given a written after visit summary which includes her diagnoses, current medications and vitals. Patient expressed understanding with the diagnosis and plan. Follow-up Disposition:  Return in about 6 weeks (around 11/6/2018) for weight. Mark Flynn

## 2018-09-25 NOTE — MR AVS SNAPSHOT
[address] [place] [address] 
[phone] Patient: Ion Gaffney MRN: [id number] :1995 Visit Information Date & Time Provider Department Dept. Phone Encounter #  
 2018 12:00 PM MD Laisha Rubio [facility] [phone] [id number] Follow-up Instructions Return in about 6 weeks (around 2018) for weight. Ricardo Maldonado Upcoming Health Maintenance Date Due DTaP/Tdap/Td series (6 - Td) 2016 PAP AKA CERVICAL CYTOLOGY 2016 Influenza Age 5 to Adult 2018* *Topic was postponed. The date shown is not the original due date. Allergies as of 2018  Review Complete On: 2018 By: Samy Sal MD  
  
 Severity Noted Reaction Type Reactions Nitrous Oxide High 2013    Anaphylaxis HAD TO BE BAGGED IN DENTIST OFFICE PER MOTHER Other Medication High 2013    Anaphylaxis Nitrous Oxide Septocaine [Articaine-epinephrine] High 2013    Anaphylaxis HAD TO BE BAGGED IN DENTIST OFFICE PER MOTHER Current Immunizations  Reviewed on 2018 Name Date DTAP Vaccine 1996, 1996, 1996, 1995, 1995 HIB Vaccine 10/4/1996, 1996, 1995, 1995 HPV 2010, 2009 Hepatitis B Vaccine 3/25/1996, 1995, 1995 Human Papillomavirus 2009 IPV 2000, 1996, 1995, 1995 MMR Vaccine 2000, 10/4/1996 Meningococcal Vaccine 2008 PPD 1/10/2013  4:01 PM  
 TDAP Vaccine 2006 Varicella Virus Vaccine 1/10/2013  4:01 PM  
 Varicella Virus Vaccine Live 1996 Reviewed by aSmy Sal MD on 2018 at 12:26 PM  
You Were Diagnosed With   
  
 Codes Comments Anorexia    -  Primary ICD-10-CM: R63.0 ICD-9-CM: 783.0 Eczema, unspecified type     ICD-10-CM: L30.9 ICD-9-CM: 692.9 Vitals BP Pulse Temp Resp Height(growth percentile) Weight(growth percentile) 118/81 (BP 1 Location: Left arm, BP Patient Position: Sitting) 69 98 °F (36.7 °C) (Oral) 16 5' 4.5\" (1.638 m) 117 lb 12.8 oz (53.4 kg) LMP SpO2 BMI OB Status Smoking Status 09/09/2018 100% 19.91 kg/m2 Having regular periods Never Smoker BMI and BSA Data Body Mass Index Body Surface Area  
 19.91 kg/m 2 1.56 m 2 Preferred Pharmacy Pharmacy Name Phone [id number], [address] [facility] [phone] Your Updated Medication List  
  
   
This list is accurate as of 9/25/18 12:27 PM.  Always use your most recent med list.  
  
  
  
  
 escitalopram oxalate 20 mg tablet Commonly known as:  [place] Bird TAKE 1 TABLET BY MOUTH EVERY DAY  
  
 traZODone 50 mg tablet Commonly known as:  DESYREL  
TAKE 1.5  TAB BY MOUTH NIGHTLY.  
  
 triamcinolone acetonide 0.025 % ointment Commonly known as:  KENALOG Apply  to affected area two (2) times a day. use thin layer Prescriptions Sent to Pharmacy Refills  
 triamcinolone acetonide (KENALOG) 0.025 % ointment 1 Sig: Apply  to affected area two (2) times a day. use thin layer Class: Normal  
 Pharmacy: [id number] [facility], [address] [facility] #: [phone] Route: Topical  
  
Follow-up Instructions Return in about 6 weeks (around 11/6/2018) for weight. Lesly Cain Introducing [facility] & HEALTH SERVICES! Dear John Song: Thank you for requesting a Bright!Tax account. Our records indicate that you already have an active Bright!Tax account. You can access your account anytime at [email or web address]. Lumier/Monford [employer] Systems Did you know that you can access your hospital and ER discharge instructions at any time in Bright!Tax? You can also review all of your test results from your hospital stay or ER visit. Additional Information If you have questions, please visit the Frequently Asked Questions section of the HashCube website at [email or web address] (Be[employer] Technology. Cirtas Systems. Khush/Deerpath Energyt/. Remember, HashCube is NOT to be used for urgent needs. For medical emergencies, dial 911. Now available from your iPhone and Android! Please provide this summary of care documentation to your next provider. Your primary care clinician is listed as Carlos Sainz. If you have any questions after today's visit, please call [phone].

## 2018-09-25 NOTE — PROGRESS NOTES
1. Have you been to the ER, urgent care clinic since your last visit? Hospitalized since your last visit? No    2. Have you seen or consulted any other health care providers outside of the [address] [place] [address] since your last visit? Include any pap smears or colon screening.  Ayad Clayton and Therapist Cici Moffett     Chief Complaint   Patient presents with    Anorexia     f/u

## 2018-11-06 ENCOUNTER — OFFICE VISIT (OUTPATIENT)
Dept: PRIMARY CARE CLINIC | Age: 23
End: 2018-11-06

## 2018-11-06 VITALS
SYSTOLIC BLOOD PRESSURE: 115 MMHG | HEIGHT: 65 IN | DIASTOLIC BLOOD PRESSURE: 73 MMHG | HEART RATE: 70 BPM | BODY MASS INDEX: 20.16 KG/M2 | OXYGEN SATURATION: 100 % | TEMPERATURE: 98.5 F | WEIGHT: 121 LBS | RESPIRATION RATE: 16 BRPM

## 2018-11-06 DIAGNOSIS — G47.01 INSOMNIA DUE TO MEDICAL CONDITION: ICD-10-CM

## 2018-11-06 DIAGNOSIS — R63.0 ANOREXIA: Primary | ICD-10-CM

## 2018-11-06 DIAGNOSIS — F41.9 ANXIETY: ICD-10-CM

## 2018-11-06 NOTE — PROGRESS NOTES
Subjective:     Chief Complaint   Patient presents with    Eating Disorder     f/u        She  is a 21y.o. year old female who presents today for anorexia follow up. Patient reports that she has been doing very well. She feels like\" normal\" these days. Her appetite is great. States that anxiety and insomnia has been well controlled with Lexapro and trazodone. She continue to see psychotherapist and nutritionist regularly. Denies any chest pain, soa, cough, abdominal pain. Wt Readings from Last 3 Encounters:   11/06/18 121 lb (54.9 kg)   09/25/18 117 lb 12.8 oz (53.4 kg)   08/22/18 116 lb 12.8 oz (53 kg)         Pertinent items are noted in HPI.   Objective:     Vitals:    11/06/18 0853   BP: 115/73   Pulse: 70   Resp: 16   Temp: 98.5 °F (36.9 °C)   TempSrc: Oral   SpO2: 100%   Weight: 121 lb (54.9 kg)   Height: 5' 4.5\" (1.638 m)       Physical Examination: General appearance - alert, well appearing, and in no distress, oriented to person, place, and time and normal appearing weight  Mental status - alert, oriented to person, place, and time, normal mood, behavior, speech, dress, motor activity, and thought processes  Chest - clear to auscultation, no wheezes, rales or rhonchi, symmetric air entry  Heart - normal rate, regular rhythm, normal S1, S2, no murmurs, rubs, clicks or gallops    Allergies   Allergen Reactions    Nitrous Oxide Anaphylaxis     HAD TO BE BAGGED IN DENTIST OFFICE PER MOTHER    Other Medication Anaphylaxis     Nitrous Oxide      Septocaine [Articaine-Epinephrine] Anaphylaxis     HAD TO BE BAGGED IN DENTIST OFFICE PER MOTHER      Social History     Socioeconomic History    Marital status: SINGLE     Spouse name: Not on file    Number of children: Not on file    Years of education: Not on file    Highest education level: Not on file   Social Needs    Financial resource strain: Not on file    Food insecurity - worry: Not on file    Food insecurity - inability: Not on file   [address] [place] [address] Transportation needs - medical: Not on file   [employer] needs - non-medical: Not on file   Occupational History    Not on file   Tobacco Use    Smoking status: Never Smoker    Smokeless tobacco: Never Used   Substance and Sexual Activity    Alcohol use: Yes     Comment: rarely    Drug use: No    Sexual activity: Yes     Partners: Male   Other Topics Concern    Not on file   Social History Narrative    ** Merged History Encounter **           Family History   Problem Relation Age of Onset    No Known Problems Father     Stroke Maternal Grandmother     Cancer Maternal Grandmother         Lung    Heart Disease Maternal Grandfather     Hypertension Maternal Grandfather     Cancer Maternal Grandfather         Brain    Hypertension Paternal Grandmother     Other Paternal Grandmother         ALLERGY TO NOVACAINE    Heart Disease Paternal Grandfather     Other Paternal Grandfather         DIALYSIS      Past Surgical History:   Procedure Laterality Date    HX HEENT      ORAL SURGERY-LOCAL WITH NITROUS OXIDE    HX OTHER SURGICAL      Wart removed from L arm. Past Medical History:   Diagnosis Date    Anxiety 3/27/2014    Depression 3/27/2014    Depression, major, single episode, mild ([facility] [address] 3/27/2014    Dysmenorrhea 2/26/2016    Eczema     History of seasonal allergies     Insomnia 3/27/2014      Current Outpatient Medications   Medication Sig Dispense Refill    triamcinolone acetonide (KENALOG) 0.025 % ointment Apply  to affected area two (2) times a day. use thin layer 30 g 1    traZODone (DESYREL) 50 mg tablet TAKE 1.5  TAB BY MOUTH NIGHTLY. 45 Tab 6    escitalopram oxalate (LEXAPRO) 20 mg tablet TAKE 1 TABLET BY MOUTH EVERY DAY 30 Tab 2        Assessment/ Plan:   Diagnoses and all orders for this visit:    1. Anorexia  -     Patient has been doing very well. Gained 4 pounds since last visit.    -   CBC WITH AUTOMATED DIFF  -     METABOLIC PANEL, COMPREHENSIVE  -     TSH AND FREE T4    2. Insomnia due to medical condition        - well managed by Trazodone. 3. Anxiety       - well controled by Lexapro. Medication risks/benefits/costs/interactions/alternatives discussed with patient. Advised patient to call back or return to office if symptoms worsen/change/persist. If patient cannot reach us or should anything more severe/urgent arise he/she should proceed directly to the nearest emergency department. Discussed expected course/resolution/complications of diagnosis in detail with patient. Patient given a written after visit summary which includes her diagnoses, current medications and vitals. Patient expressed understanding with the diagnosis and plan.        Follow-up Disposition: Not on File

## 2018-11-06 NOTE — PROGRESS NOTES
Chief Complaint   Patient presents with    Eating Disorder     f/u     1. Have you been to the ER, urgent care clinic since your last visit? Hospitalized since your last visit? No    2. Have you seen or consulted any other health care providers outside of the [address] [place] [address] since your last visit? Include any pap smears or colon screening.  No

## 2018-11-07 LAB
ALBUMIN SERPL-MCNC: 4.6 G/DL (ref 3.5–5.5)
ALBUMIN/GLOB SERPL: 2.1 {RATIO} (ref 1.2–2.2)
ALP SERPL-CCNC: 51 IU/L (ref 39–117)
ALT SERPL-CCNC: 16 IU/L (ref 0–32)
AST SERPL-CCNC: 23 IU/L (ref 0–40)
BASOPHILS # BLD AUTO: 0.1 X10E3/UL (ref 0–0.2)
BASOPHILS NFR BLD AUTO: 1 %
BILIRUB SERPL-MCNC: 0.3 MG/DL (ref 0–1.2)
BUN SERPL-MCNC: 12 MG/DL (ref 6–20)
BUN/CREAT SERPL: 17 (ref 9–23)
CALCIUM SERPL-MCNC: 9.7 MG/DL (ref 8.7–10.2)
CHLORIDE SERPL-SCNC: 101 MMOL/L (ref 96–106)
CO2 SERPL-SCNC: 27 MMOL/L (ref 20–29)
CREAT SERPL-MCNC: 0.7 MG/DL (ref 0.57–1)
EOSINOPHIL # BLD AUTO: 0.2 X10E3/UL (ref 0–0.4)
EOSINOPHIL NFR BLD AUTO: 4 %
ERYTHROCYTE [DISTWIDTH] IN BLOOD BY AUTOMATED COUNT: 13.9 % (ref 12.3–15.4)
GLOBULIN SER CALC-MCNC: 2.2 G/DL (ref 1.5–4.5)
GLUCOSE SERPL-MCNC: 84 MG/DL (ref 65–99)
HCT VFR BLD AUTO: 35.5 % (ref 34–46.6)
HGB BLD-MCNC: 11.7 G/DL (ref 11.1–15.9)
IMM GRANULOCYTES # BLD: 0 X10E3/UL (ref 0–0.1)
IMM GRANULOCYTES NFR BLD: 0 %
LYMPHOCYTES # BLD AUTO: 2.2 X10E3/UL (ref 0.7–3.1)
LYMPHOCYTES NFR BLD AUTO: 39 %
MCH RBC QN AUTO: 29.7 PG (ref 26.6–33)
MCHC RBC AUTO-ENTMCNC: 33 G/DL (ref 31.5–35.7)
MCV RBC AUTO: 90 FL (ref 79–97)
MONOCYTES # BLD AUTO: 0.5 X10E3/UL (ref 0.1–0.9)
MONOCYTES NFR BLD AUTO: 10 %
NEUTROPHILS # BLD AUTO: 2.5 X10E3/UL (ref 1.4–7)
NEUTROPHILS NFR BLD AUTO: 46 %
PLATELET # BLD AUTO: 309 X10E3/UL (ref 150–379)
POTASSIUM SERPL-SCNC: 4.2 MMOL/L (ref 3.5–5.2)
PROT SERPL-MCNC: 6.8 G/DL (ref 6–8.5)
RBC # BLD AUTO: 3.94 X10E6/UL (ref 3.77–5.28)
SODIUM SERPL-SCNC: 142 MMOL/L (ref 134–144)
T4 FREE SERPL-MCNC: 1.26 NG/DL (ref 0.82–1.77)
TSH SERPL DL<=0.005 MIU/L-ACNC: 5.67 UIU/ML (ref 0.45–4.5)
WBC # BLD AUTO: 5.5 X10E3/UL (ref 3.4–10.8)

## 2018-11-07 NOTE — PROGRESS NOTES
Result sent through my chart. Che Sabillon,    Recent lab results showed minor thyroid abnormality. Nothing concerning at this point. Will have a follow up lab in 6 months. Otherwise CBC, Kidney and liver function is normal.     Thanks.     Dr. Isaias Mancera

## 2018-11-30 DIAGNOSIS — F41.9 ANXIETY: ICD-10-CM

## 2018-11-30 DIAGNOSIS — R63.0 ANOREXIA: ICD-10-CM

## 2018-11-30 RX ORDER — ESCITALOPRAM OXALATE 20 MG/1
TABLET ORAL
Qty: 30 TAB | Refills: 6 | Status: SHIPPED | OUTPATIENT
Start: 2018-11-30 | End: 2019-08-24 | Stop reason: SDUPTHER

## 2019-01-08 ENCOUNTER — OFFICE VISIT (OUTPATIENT)
Dept: PRIMARY CARE CLINIC | Age: 24
End: 2019-01-08

## 2019-01-08 VITALS
OXYGEN SATURATION: 99 % | RESPIRATION RATE: 18 BRPM | HEIGHT: 65 IN | BODY MASS INDEX: 20.66 KG/M2 | WEIGHT: 124 LBS | TEMPERATURE: 98 F | SYSTOLIC BLOOD PRESSURE: 118 MMHG | DIASTOLIC BLOOD PRESSURE: 73 MMHG | HEART RATE: 60 BPM

## 2019-01-08 DIAGNOSIS — N89.8 VAGINAL DISCHARGE: ICD-10-CM

## 2019-01-08 DIAGNOSIS — G47.01 INSOMNIA DUE TO MEDICAL CONDITION: ICD-10-CM

## 2019-01-08 DIAGNOSIS — Z01.419 WOMEN'S ANNUAL ROUTINE GYNECOLOGICAL EXAMINATION: Primary | ICD-10-CM

## 2019-01-08 DIAGNOSIS — F41.9 ANXIETY: ICD-10-CM

## 2019-01-08 DIAGNOSIS — R63.0 ANOREXIA: ICD-10-CM

## 2019-01-08 RX ORDER — PIMECROLIMUS 10 MG/G
CREAM TOPICAL
COMMUNITY
Start: 2018-12-17

## 2019-01-08 NOTE — PROGRESS NOTES
Subjective:     Chief Complaint   Patient presents with    Eating Disorder     2 month f/u    Gyn Exam        She  is a 21y.o. year old female present  today for anorexia follow up as well as pap smear. Patient reports that she has been doing very well. She feels like\" normal\" these days. Her appetite is great. States that anxiety and insomnia has been well controlled with Lexapro and trazodone. She continue to see psychotherapist and nutritionist regularly. Denies any chest pain, soa, cough, abdominal pain. Wt Readings from Last 3 Encounters:   01/08/19 124 lb (56.2 kg)   11/06/18 121 lb (54.9 kg)   09/25/18 117 lb 12.8 oz (53.4 kg)     She denies any vaginal discharge, lower abdominal pain. Period is normal.     Pertinent items are noted in HPI. Objective:     Vitals:    01/08/19 1230   BP: 118/73   Pulse: 60   Resp: 18   Temp: 98 °F (36.7 °C)   TempSrc: Oral   SpO2: 99%   Weight: 124 lb (56.2 kg)   Height: 5' 4.5\" (1.638 m)       Physical Examination: General appearance - alert, well appearing, and in no distress, oriented to person, place, and time and normal appearing weight  Mental status - alert, oriented to person, place, and time, normal mood, behavior, speech, dress, motor activity, and thought processes  Chest - clear to auscultation, no wheezes, rales or rhonchi, symmetric air entry  Heart - normal rate, regular rhythm, normal S1, S2, no murmurs, rubs, clicks or gallops  Pelvic - VULVA: normal appearing vulva with no masses, tenderness or lesions, VAGINA: thick, white, odorless vaginal discharge noted. , CERVIX: normal appearing cervix without discharge or lesions, UTERUS: uterus is normal size, shape, consistency and nontender, ADNEXA: normal adnexa in size, nontender and no masses, PAP: Pap smear done today, exam chaperoned by Gordon Benavides.     Allergies   Allergen Reactions    Nitrous Oxide Anaphylaxis     HAD TO BE BAGGED IN DENTIST OFFICE PER MOTHER    Other Medication Anaphylaxis     Nitrous Oxide      Septocaine [Articaine-Epinephrine] Anaphylaxis     HAD TO BE BAGGED IN DENTIST OFFICE PER MOTHER      Social History     Socioeconomic History    Marital status: SINGLE     Spouse name: Not on file    Number of children: Not on file    Years of education: Not on file    Highest education level: Not on file   Tobacco Use    Smoking status: Never Smoker    Smokeless tobacco: Never Used   Substance and Sexual Activity    Alcohol use: Yes     Comment: rarely    Drug use: No    Sexual activity: Yes     Partners: Male   Social History Narrative    ** Merged History Encounter **           Family History   Problem Relation Age of Onset    No Known Problems Father     Stroke Maternal Grandmother     Cancer Maternal Grandmother         Lung    Heart Disease Maternal Grandfather     Hypertension Maternal Grandfather     Cancer Maternal Grandfather         Brain    Hypertension Paternal Grandmother     Other Paternal Grandmother         ALLERGY TO NOVACAINE    Heart Disease Paternal Grandfather     Other Paternal Grandfather         DIALYSIS      Past Surgical History:   Procedure Laterality Date    HX HEENT      ORAL SURGERY-LOCAL WITH NITROUS OXIDE    HX OTHER SURGICAL      Wart removed from L arm. Past Medical History:   Diagnosis Date    Anxiety 3/27/2014    Depression 3/27/2014    [facility] 3/27/2014    Dysmenorrhea 2/26/2016    Eczema     History of seasonal allergies     Insomnia 3/27/2014      Current Outpatient Medications   Medication Sig Dispense Refill    ELIDEL 1 % topical cream       escitalopram oxalate (LEXAPRO) 20 mg tablet TAKE 1 TABLET BY MOUTH EVERY DAY 30 Tab 6    traZODone (DESYREL) 50 mg tablet TAKE 1.5  TAB BY MOUTH NIGHTLY. 45 Tab 6        Assessment/ Plan:   Diagnoses and all orders for this visit:    1.  Women's annual routine gynecological examination  -     PAP IG, CT-NG, RFX APTIMA HPV ASCUS ([id number], [id number])); Future              Will notify result. 2. Vaginal discharge  -     NUSWAB VAGINITIS PLUS              Will notify result. 3. Anorexia      - doing very well. Slowly gaining weight. Has been following up with psychotherapist and nutritionist.   4. Insomnia due to medical condition      - well controlled with Trazodone. 5. Anxiety      -  Well controlled with lexapro. Medication risks/benefits/costs/interactions/alternatives discussed with patient. Advised patient to call back or return to office if symptoms worsen/change/persist. If patient cannot reach us or should anything more severe/urgent arise he/she should proceed directly to the nearest emergency department. Discussed expected course/resolution/complications of diagnosis in detail with patient. Patient given a written after visit summary which includes her diagnoses, current medications and vitals. Patient expressed understanding with the diagnosis and plan. Follow-up Disposition:  Return in about 4 months (around 5/8/2019) for weight and thyroid check up. Osiel Sunshine

## 2019-01-11 ENCOUNTER — TELEPHONE (OUTPATIENT)
Dept: PRIMARY CARE CLINIC | Age: 24
End: 2019-01-11

## 2019-01-11 DIAGNOSIS — N76.0 BV (BACTERIAL VAGINOSIS): Primary | ICD-10-CM

## 2019-01-11 DIAGNOSIS — B96.89 BV (BACTERIAL VAGINOSIS): Primary | ICD-10-CM

## 2019-01-11 LAB
A VAGINAE DNA VAG QL NAA+PROBE: ABNORMAL SCORE
BVAB2 DNA VAG QL NAA+PROBE: ABNORMAL SCORE
C ALBICANS DNA VAG QL NAA+PROBE: NEGATIVE
C GLABRATA DNA VAG QL NAA+PROBE: NEGATIVE
C TRACH RRNA SPEC QL NAA+PROBE: NEGATIVE
MEGA1 DNA VAG QL NAA+PROBE: ABNORMAL SCORE
N GONORRHOEA RRNA SPEC QL NAA+PROBE: NEGATIVE
T VAGINALIS RRNA SPEC QL NAA+PROBE: NEGATIVE

## 2019-01-11 RX ORDER — METRONIDAZOLE 500 MG/1
500 TABLET ORAL 2 TIMES DAILY WITH MEALS
Qty: 14 TAB | Refills: 0 | Status: SHIPPED | OUTPATIENT
Start: 2019-01-11 | End: 2019-01-18

## 2019-01-11 NOTE — PROGRESS NOTES
Please call patient     Vaginal swab is positive for bacterial infection. Sent a prescription of Metronidazole 500 mg BID for 7 days. Avoid any alcohol while on this medication. Few common side effect are nausea, metallic taste in mouth, epigastric discomfort, rash, dark red brown urine. Follow up if symptoms persists.

## 2019-01-11 NOTE — TELEPHONE ENCOUNTER
----- Message from Deni Ryan MD sent at 1/11/2019  7:49 AM EST -----  Please call patient     Vaginal swab is positive for bacterial infection. Sent a prescription of Metronidazole 500 mg BID for 7 days. Avoid any alcohol while on this medication. Few common side effect are nausea, metallic taste in mouth, epigastric discomfort, rash, dark red brown urine. Follow up if symptoms persists.

## 2019-01-14 LAB
C TRACH RRNA CVX QL NAA+PROBE: NEGATIVE
CYTOLOGIST CVX/VAG CYTO: NORMAL
CYTOLOGY CVX/VAG DOC THIN PREP: NORMAL
DX ICD CODE: NORMAL
LABCORP, 190119: NORMAL
Lab: NORMAL
N GONORRHOEA RRNA CVX QL NAA+PROBE: NEGATIVE
OTHER STN SPEC: NORMAL
PATH REPORT.FINAL DX SPEC: NORMAL
STAT OF ADQ CVX/VAG CYTO-IMP: NORMAL

## 2019-07-22 DIAGNOSIS — G47.01 INSOMNIA DUE TO MEDICAL CONDITION: ICD-10-CM

## 2019-07-22 DIAGNOSIS — F32.0 DEPRESSION, MAJOR, SINGLE EPISODE, MILD (HCC): ICD-10-CM

## 2019-07-22 DIAGNOSIS — R63.0 ANOREXIA: ICD-10-CM

## 2019-07-22 RX ORDER — TRAZODONE HYDROCHLORIDE 50 MG/1
TABLET ORAL
Qty: 45 TAB | Refills: 6 | Status: SHIPPED | OUTPATIENT
Start: 2019-07-22 | End: 2020-06-02 | Stop reason: SDUPTHER

## 2020-05-07 DIAGNOSIS — F32.0 DEPRESSION, MAJOR, SINGLE EPISODE, MILD (HCC): ICD-10-CM

## 2020-05-07 DIAGNOSIS — R63.0 ANOREXIA: ICD-10-CM

## 2020-05-07 DIAGNOSIS — G47.01 INSOMNIA DUE TO MEDICAL CONDITION: ICD-10-CM

## 2020-05-07 RX ORDER — TRAZODONE HYDROCHLORIDE 50 MG/1
TABLET ORAL
Qty: 45 TAB | Refills: 6 | OUTPATIENT
Start: 2020-05-07

## 2020-05-07 NOTE — TELEPHONE ENCOUNTER
Requested Prescriptions     Pending Prescriptions Disp Refills    traZODone (DESYREL) 50 mg tablet 45 Tab 6     Sig: TAKE 1 AND 1/2 TABLET BY MOUTH DAILY AT BEDTIME     [facility] on [address]

## 2020-05-07 NOTE — TELEPHONE ENCOUNTER
Spoke with pharmacy regarding patient to call the office to schedule virtual visit per Dr. Britany Adkins

## 2020-05-29 DIAGNOSIS — F41.9 ANXIETY: ICD-10-CM

## 2020-05-29 DIAGNOSIS — F32.0 DEPRESSION, MAJOR, SINGLE EPISODE, MILD (HCC): ICD-10-CM

## 2020-05-29 DIAGNOSIS — G47.01 INSOMNIA DUE TO MEDICAL CONDITION: ICD-10-CM

## 2020-05-29 DIAGNOSIS — R63.0 ANOREXIA: ICD-10-CM

## 2020-06-01 ENCOUNTER — TELEPHONE (OUTPATIENT)
Dept: PRIMARY CARE CLINIC | Age: 25
End: 2020-06-01

## 2020-06-01 RX ORDER — TRAZODONE HYDROCHLORIDE 50 MG/1
TABLET ORAL
Qty: 45 TAB | Refills: 6 | OUTPATIENT
Start: 2020-06-01

## 2020-06-01 RX ORDER — ESCITALOPRAM OXALATE 20 MG/1
TABLET ORAL
Qty: 15 TAB | Refills: 0 | OUTPATIENT
Start: 2020-06-01

## 2020-06-01 NOTE — TELEPHONE ENCOUNTER
LVM for patient to schedule for virtual visit for medication refills        Advised patient pharmacy patient she is need of follow up visit for refills pilonidal abscess - I&D pilonidal abscess - I&D. No constitutional Sx, no sign of fistula, well appearing. Tolerated I&D. Advised pmd f/u for wound check

## 2020-06-02 ENCOUNTER — VIRTUAL VISIT (OUTPATIENT)
Dept: PRIMARY CARE CLINIC | Age: 25
End: 2020-06-02

## 2020-06-02 DIAGNOSIS — R63.0 ANOREXIA: ICD-10-CM

## 2020-06-02 DIAGNOSIS — F41.9 ANXIETY: Primary | ICD-10-CM

## 2020-06-02 DIAGNOSIS — F32.0 DEPRESSION, MAJOR, SINGLE EPISODE, MILD (HCC): ICD-10-CM

## 2020-06-02 DIAGNOSIS — G47.01 INSOMNIA DUE TO MEDICAL CONDITION: ICD-10-CM

## 2020-06-02 RX ORDER — TRAZODONE HYDROCHLORIDE 50 MG/1
TABLET ORAL
Qty: 45 TAB | Refills: 5 | Status: SHIPPED | OUTPATIENT
Start: 2020-06-02 | End: 2020-12-22

## 2020-06-02 RX ORDER — ESCITALOPRAM OXALATE 20 MG/1
TABLET ORAL
Qty: 90 TAB | Refills: 1 | Status: SHIPPED | OUTPATIENT
Start: 2020-06-02 | End: 2020-12-22

## 2020-06-02 NOTE — PROGRESS NOTES
Sandra Delgado is a 25 y.o. female who was seen by synchronous (real-time) audio-video technology on 6/2/2020. Consent: Sandra Delgado, who was seen by synchronous (real-time) audio-video technology, and/or her healthcare decision maker, is aware that this patient-initiated, Telehealth encounter on 6/2/2020 is a billable service, with coverage as determined by her insurance carrier. She is aware that she may receive a bill and has provided verbal consent to proceed: Yes. I was in the office while conducting this encounter. This virtual visit was conducted via [employer]. Pursuant to the emergency declaration under the [facility] waiver authority and the [employer] Act, this Virtual  Visit was conducted to reduce the patient's risk of exposure to COVID-19 and provide continuity of care for an established patient. Services were provided through a video synchronous discussion virtually to substitute for in-person clinic visit. Due to this being a TeleHealth evaluation, many elements of the physical examination are unable to be assessed. Assessment & Plan:     Diagnoses and all orders for this visit:    1. Anxiety  -   Well controlled with  escitalopram oxalate (LEXAPRO) 20 mg tablet; TAKE 1 TABLET BY MOUTH EVERY DAY    2. Depression, major, single episode, mild ([address]  -   Well controlled with  traZODone (DESYREL) 50 mg tablet; TAKE 1 AND 1/2 TABLET BY MOUTH DAILY AT BEDTIME    3. Insomnia due to medical condition  -  Well controlled with   traZODone (DESYREL) 50 mg tablet; TAKE 1 AND 1/2 TABLET BY MOUTH DAILY AT BEDTIME    4. Anorexia       - Resolved. Her appetite is good. No concerns for anorexia. Continue therapy. Follow-up and Dispositions    · Return in about 2 months (around 8/2/2020), or if symptoms worsen or fail to improve, for complete physical  and fasting blood work. Isac Alejandro Subjective:   Mikey Oh is a 25 y.o. female who was seen for Anxiety (Doing well with current regiment. Need refill of meds. ) and Insomnia (Doing well with current regiment. Need refill of meds. )    She  is a 25y.o. year old female with hx of anorexia, anxiety, depression who presents today for  follow up after 1 1/2 years. Patient reports that she has been doing very well. She feels like\" normal\" these days. Her appetite is great. Maintaining her weight. States that anxiety and insomnia has been well controlled with Lexapro and trazodone. She continue to see psychotherapist however she graduated from the nutritionist since she has been doing very well. Denies any chest pain, soa, cough, abdominal pain, depression. Working 40 hours at her [employer]. Started doing light exercise. Good appetite. Haven't checked her weight recently she states. Prior to Admission medications    Medication Sig Start Date End Date Taking?  Authorizing Provider   escitalopram oxalate (LEXAPRO) 20 mg tablet TAKE 1 TABLET BY MOUTH EVERY DAY 6/2/20  Yes Carlos Sainz MD   traZODone (DESYREL) 50 mg tablet TAKE 1 AND 1/2 TABLET BY MOUTH DAILY AT BEDTIME 6/2/20  Yes Robina Sainz MD   ELIDEL 1 % topical cream  12/17/18   Provider, Historical     Allergies   Allergen Reactions    Nitrous Oxide Anaphylaxis     HAD TO BE BAGGED IN DENTIST OFFICE PER MOTHER    Other Medication Anaphylaxis     Nitrous Oxide      Septocaine [Articaine-Epinephrine] Anaphylaxis     HAD TO BE BAGGED IN DENTIST OFFICE PER MOTHER       Patient Active Problem List   Diagnosis Code    H/O seasonal allergies Z88.9    Impacted teeth with abnormal position K01.1    Depression, major, single episode, mild (HCC) F32.0    Anxiety F41.9    Insomnia G47.00    Dysmenorrhea N94.6    Anorexia R63.0     Current Outpatient Medications   Medication Sig Dispense Refill    escitalopram oxalate (LEXAPRO) 20 mg tablet TAKE 1 TABLET BY MOUTH EVERY DAY 90 Tab 1    traZODone (DESYREL) 50 mg tablet TAKE 1 AND 1/2 TABLET BY MOUTH DAILY AT BEDTIME 45 Tab 5    ELIDEL 1 % topical cream        Allergies   Allergen Reactions    Nitrous Oxide Anaphylaxis     HAD TO BE BAGGED IN DENTIST OFFICE PER MOTHER    Other Medication Anaphylaxis     Nitrous Oxide      Septocaine [Articaine-Epinephrine] Anaphylaxis     HAD TO BE BAGGED IN DENTIST OFFICE PER MOTHER     Past Medical History:   Diagnosis Date    Anxiety 3/27/2014    Depression 3/27/2014    Depression, major, single episode, mild ([facility] [address] 3/27/2014    Dysmenorrhea 2/26/2016    Eczema     History of seasonal allergies     Insomnia 3/27/2014       Review of Systems   Constitutional: Negative for chills and fever. Cardiovascular: Negative for chest pain and palpitations. Gastrointestinal: Negative for nausea and vomiting. Psychiatric/Behavioral: Negative for depression. The patient has insomnia. The patient is not nervous/anxious. Objective:   Vital Signs: (As obtained by patient/caregiver at home)  There were no vitals taken for this visit.      [INSTRUCTIONS:  \"[x]\" Indicates a positive item  \"[]\" Indicates a negative item  -- DELETE ALL ITEMS NOT EXAMINED]    Constitutional: [x] Appears well-developed and well-nourished [x] No apparent distress      [] Abnormal -     Mental status: [x] Alert and awake  [x] Oriented to person/place/time [x] Able to follow commands    [] Abnormal -     Eyes:   EOM    [x]  Normal    [] Abnormal -   Sclera  [x]  Normal    [] Abnormal -          Discharge [x]  None visible   [] Abnormal -     HENT: [x] Normocephalic, atraumatic  [] Abnormal -   [x] Mouth/Throat: Mucous membranes are moist    External Ears [x] Normal  [] Abnormal -    Neck: [x] No visualized mass [] Abnormal -     Pulmonary/Chest: [x] Respiratory effort normal   [x] No visualized signs of difficulty breathing or respiratory distress        [] Abnormal -      Musculoskeletal:   [x] Normal gait with no signs of ataxia         [x] Normal range of motion of neck        [] Abnormal -     Neurological:        [x] No Facial Asymmetry (Cranial nerve 7 motor function) (limited exam due to video visit)          [x] No gaze palsy        [] Abnormal -          Skin:        [x] No significant exanthematous lesions or discoloration noted on facial skin         [] Abnormal -            Psychiatric:       [x] Normal Affect [] Abnormal -        [] No Hallucinations    Other pertinent observable physical exam findings:-        We discussed the expected course, resolution and complications of the diagnosis(es) in detail. Medication risks, benefits, costs, interactions, and alternatives were discussed as indicated. I advised her to contact the office if her condition worsens, changes or fails to improve as anticipated. She expressed understanding with the diagnosis(es) and plan. Alla Montalvo is a 25 y.o. female who was evaluated by a video visit encounter for concerns as above. Patient identification was verified prior to start of the visit. A caregiver was present when appropriate. Due to this being a TeleHealth encounter (During [facility]- public health emergency), evaluation of the following organ systems was limited: Vitals/Constitutional/EENT/Resp/CV/GI//MS/Neuro/Skin/Heme-Lymph-Imm. Pursuant to the emergency declaration under the [facility] waiver authority and the [employer] Act, this Virtual  Visit was conducted, with patient's (and/or legal guardian's) consent, to reduce the patient's risk of exposure to COVID-19 and provide necessary medical care. Services were provided through a video synchronous discussion virtually to substitute for in-person clinic visit.        Shira Prince MD

## 2021-02-25 DIAGNOSIS — F32.0 DEPRESSION, MAJOR, SINGLE EPISODE, MILD (HCC): ICD-10-CM

## 2021-02-25 DIAGNOSIS — G47.01 INSOMNIA DUE TO MEDICAL CONDITION: ICD-10-CM

## 2021-02-26 RX ORDER — TRAZODONE HYDROCHLORIDE 50 MG/1
TABLET ORAL
Qty: 45 TAB | Refills: 2 | OUTPATIENT
Start: 2021-02-26

## 2021-07-22 DIAGNOSIS — F32.0 DEPRESSION, MAJOR, SINGLE EPISODE, MILD (HCC): ICD-10-CM

## 2021-07-22 DIAGNOSIS — G47.01 INSOMNIA DUE TO MEDICAL CONDITION: ICD-10-CM

## 2021-07-22 RX ORDER — TRAZODONE HYDROCHLORIDE 50 MG/1
TABLET ORAL
Qty: 15 TABLET | Refills: 0 | Status: SHIPPED | OUTPATIENT
Start: 2021-07-22 | End: 2021-08-23

## 2021-08-23 DIAGNOSIS — G47.01 INSOMNIA DUE TO MEDICAL CONDITION: ICD-10-CM

## 2021-08-23 DIAGNOSIS — F32.0 DEPRESSION, MAJOR, SINGLE EPISODE, MILD (HCC): ICD-10-CM

## 2021-08-23 RX ORDER — TRAZODONE HYDROCHLORIDE 50 MG/1
TABLET ORAL
Qty: 15 TABLET | Refills: 0 | Status: SHIPPED | OUTPATIENT
Start: 2021-08-23 | End: 2021-11-03 | Stop reason: SDUPTHER

## 2021-11-03 ENCOUNTER — OFFICE VISIT (OUTPATIENT)
Dept: PRIMARY CARE CLINIC | Age: 26
End: 2021-11-03
Payer: COMMERCIAL

## 2021-11-03 VITALS
OXYGEN SATURATION: 95 % | TEMPERATURE: 98.1 F | SYSTOLIC BLOOD PRESSURE: 120 MMHG | WEIGHT: 169 LBS | HEIGHT: 64 IN | DIASTOLIC BLOOD PRESSURE: 80 MMHG | BODY MASS INDEX: 28.85 KG/M2 | RESPIRATION RATE: 18 BRPM | HEART RATE: 85 BPM

## 2021-11-03 DIAGNOSIS — G47.01 INSOMNIA DUE TO MEDICAL CONDITION: ICD-10-CM

## 2021-11-03 DIAGNOSIS — F41.9 ANXIETY: ICD-10-CM

## 2021-11-03 DIAGNOSIS — F32.0 DEPRESSION, MAJOR, SINGLE EPISODE, MILD (HCC): ICD-10-CM

## 2021-11-03 PROCEDURE — 99213 OFFICE O/P EST LOW 20 MIN: CPT | Performed by: FAMILY MEDICINE

## 2021-11-03 RX ORDER — TRAZODONE HYDROCHLORIDE 50 MG/1
TABLET ORAL
Qty: 45 TABLET | Refills: 5 | Status: SHIPPED | OUTPATIENT
Start: 2021-11-03 | End: 2022-01-24 | Stop reason: SDUPTHER

## 2021-11-03 RX ORDER — ESCITALOPRAM OXALATE 20 MG/1
TABLET ORAL
Qty: 90 TABLET | Refills: 1 | Status: SHIPPED | OUTPATIENT
Start: 2021-11-03

## 2021-11-03 NOTE — PROGRESS NOTES
No chief complaint on file. Health Maintenance Due   Topic    Hepatitis C Screening     COVID-19 Vaccine (1)    DTaP/Tdap/Td series (6 - Td or Tdap)    Flu Vaccine (1)        1. Have you been to the ER, urgent care clinic since your last visit? Hospitalized since your last visit? No    2. Have you seen or consulted any other health care providers outside of the [address] [place] [address] since your last visit? Include any pap smears or colon screening. No    There were no vitals taken for this visit.

## 2021-11-03 NOTE — PROGRESS NOTES
Subjective:     Chief Complaint   Patient presents with    Follow-up     anxiety- insomia- depression-         She  is a 32y.o. year old female with hx of anorexia, anxiety, depression is here after a year for follow up and med refill. She reports that she has been doing really well. Anxiety , depression has been well controlled. She is not longer needing to see nutritionist but continued to follow up with therapist. Working full time. Planning to get  soon. would like to get refill of lexapro and Trazodone. Denies any chest pain, soa, cough, abdominal pain. Pertinent items are noted in HPI.   Objective:     Vitals:    11/03/21 1025 11/03/21 1028 11/03/21 1037   BP: (!) 137/95 (!) 139/93 120/80   Pulse: 85     Resp: 18     Temp: 98.1 °F (36.7 °C)     TempSrc: Temporal     SpO2: 95%     Weight: 169 lb (76.7 kg)     Height: 5' 4\" (1.626 m)         Physical Examination: General appearance - alert, well appearing, and in no distress and oriented to person, place, and time  Mental status - alert, oriented to person, place, and time, normal mood, behavior, speech, dress, motor activity, and thought processes  Chest - clear to auscultation, no wheezes, rales or rhonchi, symmetric air entry  Heart - normal rate, regular rhythm, normal S1, S2, no murmurs, rubs, clicks or gallops    Allergies   Allergen Reactions    Nitrous Oxide Anaphylaxis     HAD TO BE BAGGED IN DENTIST OFFICE PER MOTHER    Other Medication Anaphylaxis     Nitrous Oxide      Septocaine [Articaine-Epinephrine] Anaphylaxis     HAD TO BE BAGGED IN DENTIST OFFICE PER MOTHER      Social History     Socioeconomic History    Marital status: SINGLE   Tobacco Use    Smoking status: Never Smoker    Smokeless tobacco: Never Used   Vaping Use    Vaping Use: Never used   Substance and Sexual Activity    Alcohol use: Yes     Comment: rarely    Drug use: No    Sexual activity: Yes     Partners: Male   Social History Narrative    ** Merged History Encounter **           Family History   Problem Relation Age of Onset    No Known Problems Father     Stroke Maternal Grandmother     Cancer Maternal Grandmother         Lung    Heart Disease Maternal Grandfather     Hypertension Maternal Grandfather     Cancer Maternal Grandfather         Brain    Hypertension Paternal Grandmother     Other Paternal Grandmother         ALLERGY TO NOVACAINE    Heart Disease Paternal Grandfather     Other Paternal Grandfather         DIALYSIS      Past Surgical History:   Procedure Laterality Date    HX HEENT      ORAL SURGERY-LOCAL WITH NITROUS OXIDE    HX OTHER SURGICAL      Wart removed from L arm. Past Medical History:   Diagnosis Date    Anxiety 3/27/2014    Depression 3/27/2014    Depression, major, single episode, mild ([facility] [address] 3/27/2014    Dysmenorrhea 2/26/2016    Eczema     History of seasonal allergies     Insomnia 3/27/2014      Current Outpatient Medications   Medication Sig Dispense Refill    traZODone (DESYREL) 50 mg tablet TAKE 1 AND 1/2 TABLET BY MOUTH DAILY AT BEDTIME NEEDS APPT 15 Tablet 0    escitalopram oxalate (LEXAPRO) 20 mg tablet TAKE 1 TABLET BY MOUTH EVERY DAY. NEED APPOINTMENT. 15 Tablet 0    ELIDEL 1 % topical cream  (Patient not taking: Reported on 11/3/2021)          Assessment/ Plan:   Diagnoses and all orders for this visit:    1. Anxiety  -     Doing well.   escitalopram oxalate (LEXAPRO) 20 mg tablet; TAKE 1 TABLET BY MOUTH EVERY DAY.  -     THYROID CASCADE PROFILE; Future    2. Insomnia due to medical condition  -     traZODone (DESYREL) 50 mg tablet; TAKE 1 AND 1/2 TABLET BY MOUTH DAILY. 3. Depression, major, single episode, mild ([facility])  -     traZODone (DESYREL) 50 mg tablet; TAKE 1 AND 1/2 TABLET BY MOUTH DAILY. -     THYROID CASCADE PROFILE; Future           Medication risks/benefits/costs/interactions/alternatives discussed with patient.   Advised patient to call back or return to office if symptoms worsen/change/persist. If patient cannot reach us or should anything more severe/urgent arise he/she should proceed directly to the nearest emergency department. Discussed expected course/resolution/complications of diagnosis in detail with patient. Patient given a written after visit summary which includes her diagnoses, current medications and vitals. Patient expressed understanding with the diagnosis and plan. Follow-up and Dispositions    · Return if symptoms worsen or fail to improve, for complete physical  and fasting blood work. Kellie Cerna

## 2021-11-04 LAB — TSH SERPL DL<=0.005 MIU/L-ACNC: 1.98 UIU/ML (ref 0.45–4.5)

## 2021-11-05 NOTE — PROGRESS NOTES
Sent via my chart. Evans Sabillon,    It was good to see you yesterday! Thyroid level is in normal range. Take care.     Dr. Dawit López

## 2021-11-29 DIAGNOSIS — F41.9 ANXIETY: ICD-10-CM

## 2021-11-29 DIAGNOSIS — F32.0 DEPRESSION, MAJOR, SINGLE EPISODE, MILD (HCC): ICD-10-CM

## 2021-11-29 DIAGNOSIS — G47.01 INSOMNIA DUE TO MEDICAL CONDITION: ICD-10-CM

## 2021-11-29 RX ORDER — TRAZODONE HYDROCHLORIDE 50 MG/1
TABLET ORAL
Qty: 45 TABLET | Refills: 5 | OUTPATIENT
Start: 2021-11-29

## 2021-11-30 DIAGNOSIS — F32.0 DEPRESSION, MAJOR, SINGLE EPISODE, MILD (HCC): ICD-10-CM

## 2021-11-30 DIAGNOSIS — G47.01 INSOMNIA DUE TO MEDICAL CONDITION: ICD-10-CM

## 2021-12-01 ENCOUNTER — TELEPHONE (OUTPATIENT)
Dept: PRIMARY CARE CLINIC | Age: 26
End: 2021-12-01

## 2021-12-02 RX ORDER — TRAZODONE HYDROCHLORIDE 50 MG/1
TABLET ORAL
Qty: 45 TABLET | Refills: 5 | OUTPATIENT
Start: 2021-12-02

## 2022-01-24 DIAGNOSIS — G47.01 INSOMNIA DUE TO MEDICAL CONDITION: ICD-10-CM

## 2022-01-24 DIAGNOSIS — F32.0 DEPRESSION, MAJOR, SINGLE EPISODE, MILD (HCC): ICD-10-CM

## 2022-01-24 RX ORDER — TRAZODONE HYDROCHLORIDE 50 MG/1
TABLET ORAL
Qty: 45 TABLET | Refills: 3 | Status: SHIPPED | OUTPATIENT
Start: 2022-01-24

## 2022-03-20 PROBLEM — R63.0 ANOREXIA: Status: ACTIVE | Noted: 2018-02-22

## 2022-04-07 DIAGNOSIS — G47.01 INSOMNIA DUE TO MEDICAL CONDITION: ICD-10-CM

## 2022-04-07 DIAGNOSIS — F32.0 DEPRESSION, MAJOR, SINGLE EPISODE, MILD (HCC): ICD-10-CM

## 2022-04-07 RX ORDER — TRAZODONE HYDROCHLORIDE 50 MG/1
TABLET ORAL
Qty: 45 TABLET | Refills: 0 | OUTPATIENT
Start: 2022-04-07